# Patient Record
Sex: FEMALE | Race: WHITE | NOT HISPANIC OR LATINO | Employment: FULL TIME | URBAN - METROPOLITAN AREA
[De-identification: names, ages, dates, MRNs, and addresses within clinical notes are randomized per-mention and may not be internally consistent; named-entity substitution may affect disease eponyms.]

---

## 2017-07-21 ENCOUNTER — LAB REQUISITION (OUTPATIENT)
Dept: LAB | Facility: HOSPITAL | Age: 47
End: 2017-07-21
Payer: COMMERCIAL

## 2017-07-21 ENCOUNTER — ALLSCRIPTS OFFICE VISIT (OUTPATIENT)
Dept: OTHER | Facility: OTHER | Age: 47
End: 2017-07-21

## 2017-07-21 DIAGNOSIS — Z11.51 ENCOUNTER FOR SCREENING FOR HUMAN PAPILLOMAVIRUS (HPV): ICD-10-CM

## 2017-07-21 DIAGNOSIS — Z12.31 ENCOUNTER FOR SCREENING MAMMOGRAM FOR MALIGNANT NEOPLASM OF BREAST: ICD-10-CM

## 2017-07-21 PROCEDURE — 87624 HPV HI-RISK TYP POOLED RSLT: CPT | Performed by: OBSTETRICS & GYNECOLOGY

## 2017-07-21 PROCEDURE — G0145 SCR C/V CYTO,THINLAYER,RESCR: HCPCS | Performed by: OBSTETRICS & GYNECOLOGY

## 2017-07-25 LAB — HPV RRNA GENITAL QL NAA+PROBE: NORMAL

## 2017-07-26 LAB
LAB AP GYN PRIMARY INTERPRETATION: NORMAL
Lab: NORMAL

## 2017-08-18 ENCOUNTER — HOSPITAL ENCOUNTER (OUTPATIENT)
Dept: RADIOLOGY | Facility: MEDICAL CENTER | Age: 47
Discharge: HOME | End: 2017-08-18
Payer: COMMERCIAL

## 2017-08-18 DIAGNOSIS — Z12.31 ENCOUNTER FOR SCREENING MAMMOGRAM FOR MALIGNANT NEOPLASM OF BREAST: ICD-10-CM

## 2017-08-18 PROCEDURE — G0202 SCR MAMMO BI INCL CAD: HCPCS

## 2017-11-02 ENCOUNTER — OFFICE VISIT (OUTPATIENT)
Dept: URGENT CARE | Facility: MEDICAL CENTER | Age: 47
End: 2017-11-02
Payer: COMMERCIAL

## 2017-11-02 PROCEDURE — G0382 LEV 3 HOSP TYPE B ED VISIT: HCPCS

## 2017-11-02 PROCEDURE — S9083 URGENT CARE CENTER GLOBAL: HCPCS

## 2017-11-04 NOTE — PROGRESS NOTES
Assessment  1  Sinusitis (473 9) (J32 9)    Plan  Sinusitis    · Amoxicillin 500 MG Oral Capsule; TAKE 1 CAPSULE 3 TIMES DAILY UNTIL GONE    Discussion/Summary  Discussion Summary:   1  Push fluidsTake Amox 500mg  1 tablet 3x daily x 10 daysFollow-up with PCP if symptoms persist    Medication Side Effects Reviewed: Possible side effects of new medications were reviewed with the patient/guardian today  Understands and agrees with treatment plan: The treatment plan was reviewed with the patient/guardian  The patient/guardian understands and agrees with the treatment plan      Chief Complaint  Chief Complaint Free Text Note Form: pt presents today c/o cough, congestion for a week and a half  otc mucinex d      History of Present Illness  HPI: The patient is a 50-year-old female presents with a 10 day history of nasal discharge, cough and congestion  Her symptoms started initially with an upper respiratory infection but the cough and congestion have increased over the past several days  The patient denies any new fever but does complain of headache and sinus pressure  Hospital Based Practices Required Assessment:   Pain Assessment   the patient states they do not have pain  Abuse And Domestic Violence Screen   Domestic violence screen not done today  Reason DV Screen not done: with family    Depression And Suicide Screen  Suicide screen not done today  -- Reason suicide screen not done: with family  Prefered Language is  english  Primary Language is  english  Readiness To Learn: Receptive  Barriers To Learning: none  Preferred Learning: verbal      Review of Systems  Focused-Female:   Constitutional: No fever, no chills, feels well, no tiredness, no recent weight gain or loss  ENT: nasal discharge, but-- no earache,-- no sore throat-- and-- no hearing loss  Respiratory: cough-- and-- PND  Active Problems  1   Encounter for gynecological examination without abnormal finding (V82 31) (Z01 419) 2  Encounter for routine gynecological examination (V72 31) (Z01 419)   3  Encounter for screening mammogram for malignant neoplasm of breast (V76 12)   (Z12 31)   4  Fatigue (780 79) (R53 83)   5  Routine Gynecological Exam With Cervical Pap Smear (V72 31)   6  Screening for human papillomavirus (HPV) (V73 81) (Z11 51)   7  Skin irritation (709 9) (R23 8)    Past Medical History  1  History of Pulmonary disease (518 89) (J98 4)   2  History of Urinary tract infection (599 0) (N39 0)  Active Problems And Past Medical History Reviewed: The active problems and past medical history were reviewed and updated today  Family History  Child    1  Family history of Living and Healthy  Family History    2  Family history of Diabetes Mellitus (V18 0)  Family History Reviewed: The family history was reviewed and updated today  Social History   · Never A Smoker  Social History Reviewed: The social history was reviewed and updated today  Surgical History  1  History of Bladder Surgery  Surgical History Reviewed: The surgical history was reviewed and updated today  Current Meds   1  Multiple Vitamin TABS; Therapy: (Recorded:[id number]) to Recorded  Medication List Reviewed: The medication list was reviewed and updated today  Allergies  1  Erythromycin TABS   2  Penicillins    Vitals  Signs   Recorded: [id number] 04:04PM   Temperature: 97 9 F  Heart Rate: 68  Respiration: 20  Systolic: 700  Diastolic: 85  Height: 5 ft 4 in  Weight: 240 lb   BMI Calculated: 41 2  BSA Calculated: 2 11  O2 Saturation: 100    Physical Exam    Constitutional   General appearance: No acute distress, well appearing and well nourished  Ears, Nose, Mouth, and Throat   External inspection of ears and nose: Normal     Otoscopic examination: Tympanic membranes translucent with normal light reflex  Canals patent without erythema      Nasal mucosa, septum, and turbinates: Abnormal  -- Nasal turbinates are boggy and swollen, thin, clear drainage bilateral  Positive frontal and maxillary tenderness  Oropharynx: Normal with no erythema, edema, exudate or lesions  Pulmonary   Respiratory effort: No increased work of breathing or signs of respiratory distress  Auscultation of lungs: Clear to auscultation  Cardiovascular   Auscultation of heart: Normal rate and rhythm, normal S1 and S2, without murmurs         Future Appointments    Date/Time Provider Specialty Site   08/03/2018 10:00 AM Luz Marina Tierney DO Obstetrics/Gynecology [facility]     Signatures   Electronically signed by : Selin Barahona [facility]; Nov 2 2017  5:07PM EST                       (Author)    Electronically signed by : ELDON Cuellar ; Nov  3 2017  3:40PM EST                       (Co-author)

## 2018-01-13 VITALS
DIASTOLIC BLOOD PRESSURE: 88 MMHG | WEIGHT: 235.6 LBS | BODY MASS INDEX: 40.22 KG/M2 | SYSTOLIC BLOOD PRESSURE: 138 MMHG | HEIGHT: 64 IN

## 2018-08-03 ENCOUNTER — ANNUAL EXAM (OUTPATIENT)
Dept: OBGYN CLINIC | Facility: MEDICAL CENTER | Age: 48
End: 2018-08-03
Payer: COMMERCIAL

## 2018-08-03 VITALS
HEIGHT: 64 IN | SYSTOLIC BLOOD PRESSURE: 128 MMHG | DIASTOLIC BLOOD PRESSURE: 80 MMHG | BODY MASS INDEX: 40.43 KG/M2 | WEIGHT: 236.8 LBS

## 2018-08-03 DIAGNOSIS — Z12.31 SCREENING MAMMOGRAM, ENCOUNTER FOR: ICD-10-CM

## 2018-08-03 DIAGNOSIS — Z01.419 ENCOUNTER FOR GYNECOLOGICAL EXAMINATION WITHOUT ABNORMAL FINDING: Primary | ICD-10-CM

## 2018-08-03 PROCEDURE — S0612 ANNUAL GYNECOLOGICAL EXAMINA: HCPCS | Performed by: OBSTETRICS & GYNECOLOGY

## 2018-08-03 RX ORDER — ALBUTEROL SULFATE 90 UG/1
2 AEROSOL, METERED RESPIRATORY (INHALATION)
COMMUNITY
Start: 2014-10-07 | End: 2021-01-06 | Stop reason: SDUPTHER

## 2018-08-03 RX ORDER — CETIRIZINE HYDROCHLORIDE, PSEUDOEPHEDRINE HYDROCHLORIDE 5; 120 MG/1; MG/1
1 TABLET, FILM COATED, EXTENDED RELEASE ORAL 2 TIMES DAILY
COMMUNITY
End: 2020-10-28 | Stop reason: ALTCHOICE

## 2018-08-03 NOTE — PROGRESS NOTES
Assessment/Plan:    No problem-specific Assessment & Plan notes found for this encounter  Diagnoses and all orders for this visit:    Screening mammogram, encounter for  -     Mammo screening bilateral w 3d & cad; Future    Encounter for gynecological examination without abnormal finding    Other orders  -     albuterol (PROVENTIL HFA,VENTOLIN HFA) 90 mcg/act inhaler; Inhale 2 puffs  -     Multiple Vitamin-Folic Acid TABS; Take by mouth  -     cetirizine-pseudoephedrine (ZyrTEC-D) 5-120 MG per tablet; Take 1 tablet by mouth 2 (two) times a day        Annual examination was completed  Mammogram was ordered  Patient to return to the office in 1 year or as necessary  Subjective:      Patient ID: Katie Juan is a 53 y o  female  Patient returns for annual gyn visit  She has no new medical surgical issues  She has menses that are normally timed and minimal in flow  Gynecologic Exam         The following portions of the patient's history were reviewed and updated as appropriate:   She  has a past medical history of Pulmonary disease  She   Patient Active Problem List    Diagnosis Date Noted    Screening mammogram, encounter for 08/03/2018    Encounter for gynecological examination without abnormal finding 08/03/2018     She  has a past surgical history that includes Bladder surgery  Her family history includes Diabetes in her family  She  reports that she has never smoked  She has never used smokeless tobacco  She reports that she drinks alcohol  She reports that she does not use drugs  Current Outpatient Prescriptions   Medication Sig Dispense Refill    albuterol (PROVENTIL HFA,VENTOLIN HFA) 90 mcg/act inhaler Inhale 2 puffs      cetirizine-pseudoephedrine (ZyrTEC-D) 5-120 MG per tablet Take 1 tablet by mouth 2 (two) times a day      Multiple Vitamin-Folic Acid TABS Take by mouth       No current facility-administered medications for this visit        No current outpatient prescriptions on file prior to visit  No current facility-administered medications on file prior to visit  She is allergic to erythromycin and penicillins       Review of Systems   All other systems reviewed and are negative  Objective:      /80 (BP Location: Left arm, Patient Position: Sitting, Cuff Size: Large)   Ht 5' 4" (1 626 m)   Wt 107 kg (236 lb 12 8 oz)   LMP 07/14/2018   BMI 40 65 kg/m²          Physical Exam   Constitutional: She is oriented to person, place, and time  She appears well-developed and well-nourished  HENT:   Head: Normocephalic and atraumatic  Nose: Nose normal    Eyes: EOM are normal  Pupils are equal, round, and reactive to light  Neck: Normal range of motion  Neck supple  No thyromegaly present  Cardiovascular: Normal rate and regular rhythm  Pulmonary/Chest: Effort normal and breath sounds normal  No respiratory distress  Breasts no masses, tenderness, skin changes   Abdominal: Soft  Bowel sounds are normal  She exhibits no distension and no mass  There is no tenderness  Hernia confirmed negative in the right inguinal area and confirmed negative in the left inguinal area  Genitourinary: Vagina normal and uterus normal  No breast swelling, tenderness, discharge or bleeding  Pelvic exam was performed with patient supine  No labial fusion  There is no rash, tenderness, lesion or injury on the right labia  There is no rash, tenderness, lesion or injury on the left labia  Cervix exhibits no motion tenderness, no discharge and no friability  Genitourinary Comments: Ext genitalia nl female w/o lesions  Vag healthy without lesions or discharge  Cervix healthy w/o lesions or discharge  uterus nl size, NT, no mass  Adnexa NT, no mass   Musculoskeletal: Normal range of motion  She exhibits no edema  Lymphadenopathy:        Right: No inguinal adenopathy present  Left: No inguinal adenopathy present     Neurological: She is alert and oriented to person, place, and time  She has normal reflexes  Skin: Skin is warm and dry  No rash noted  Psychiatric: She has a normal mood and affect  Her behavior is normal  Thought content normal    Nursing note and vitals reviewed

## 2019-03-04 ENCOUNTER — HOSPITAL ENCOUNTER (OUTPATIENT)
Dept: RADIOLOGY | Facility: HOSPITAL | Age: 49
Discharge: HOME | End: 2019-03-04
Payer: COMMERCIAL

## 2019-03-04 ENCOUNTER — TRANSCRIBE ORDERS (OUTPATIENT)
Dept: ADMINISTRATIVE | Facility: HOSPITAL | Age: 49
End: 2019-03-04

## 2019-03-04 DIAGNOSIS — J45.30 MILD PERSISTENT ASTHMA WITHOUT COMPLICATION: Primary | ICD-10-CM

## 2019-03-04 DIAGNOSIS — J45.30 MILD PERSISTENT ASTHMA WITHOUT COMPLICATION: ICD-10-CM

## 2019-03-04 PROCEDURE — 71046 X-RAY EXAM CHEST 2 VIEWS: CPT

## 2019-03-04 PROCEDURE — 70220 X-RAY EXAM OF SINUSES: CPT

## 2019-08-09 ENCOUNTER — ANNUAL EXAM (OUTPATIENT)
Dept: OBGYN CLINIC | Facility: MEDICAL CENTER | Age: 49
End: 2019-08-09
Payer: COMMERCIAL

## 2019-08-09 VITALS
HEIGHT: 64 IN | DIASTOLIC BLOOD PRESSURE: 74 MMHG | BODY MASS INDEX: 43.67 KG/M2 | WEIGHT: 255.8 LBS | SYSTOLIC BLOOD PRESSURE: 132 MMHG

## 2019-08-09 DIAGNOSIS — Z01.419 ENCOUNTER FOR GYNECOLOGICAL EXAMINATION (GENERAL) (ROUTINE) WITHOUT ABNORMAL FINDINGS: Primary | ICD-10-CM

## 2019-08-09 DIAGNOSIS — R92.2 DENSE BREAST: ICD-10-CM

## 2019-08-09 DIAGNOSIS — Z12.39 SCREENING FOR MALIGNANT NEOPLASM OF BREAST: ICD-10-CM

## 2019-08-09 PROBLEM — R92.30 DENSE BREAST: Status: ACTIVE | Noted: 2019-08-09

## 2019-08-09 PROCEDURE — S0612 ANNUAL GYNECOLOGICAL EXAMINA: HCPCS | Performed by: OBSTETRICS & GYNECOLOGY

## 2019-08-09 RX ORDER — MELATONIN
1000 DAILY
COMMUNITY
End: 2021-11-01 | Stop reason: ALTCHOICE

## 2019-08-09 RX ORDER — LEVOCETIRIZINE DIHYDROCHLORIDE 5 MG/1
5 TABLET, FILM COATED ORAL EVERY EVENING
COMMUNITY
End: 2021-11-01 | Stop reason: ALTCHOICE

## 2019-08-09 RX ORDER — VITAMIN B COMPLEX
1 CAPSULE ORAL DAILY
COMMUNITY

## 2019-08-09 RX ORDER — TRIAMCINOLONE ACETONIDE 55 UG/1
SPRAY, METERED NASAL
COMMUNITY
End: 2021-11-01 | Stop reason: ALTCHOICE

## 2019-08-09 RX ORDER — COVID-19 ANTIGEN TEST
KIT MISCELLANEOUS
COMMUNITY
End: 2021-11-01 | Stop reason: ALTCHOICE

## 2019-08-09 NOTE — PROGRESS NOTES
Assessment/Plan:    No problem-specific Assessment & Plan notes found for this encounter  Diagnoses and all orders for this visit:    Screening for malignant neoplasm of breast  -     Mammo screening bilateral w 3d & cad; Future    Encounter for gynecological examination (general) (routine) without abnormal findings    Dense breast  -     Mammo screening bilateral w 3d & cad; Future    Other orders  -     levocetirizine (XYZAL) 5 MG tablet; Take 5 mg by mouth every evening  -     Triamcinolone Acetonide (NASACORT ALLERGY 24HR) 55 MCG/ACT AERO; into each nostril  -     b complex vitamins capsule; Take 1 capsule by mouth daily  -     cholecalciferol (VITAMIN D3) 1,000 units tablet; Take 1,000 Units by mouth daily  -     Naproxen Sodium (ALEVE) 220 MG CAPS; Take by mouth        Annual examination was completed  Mammogram was ordered  Patient to return to the office in 1 year or as necessary  Subjective:      Patient ID: Shine Stephens is a 51 y o  female  Patient returns for annual gyn visit  Since she was last seen she is recovering from right wrist and hand surgery  She will have her left wrist and hand operated on in November  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  She has no gynecologic complaints  Her menses have been normal in timing and flow  The following portions of the patient's history were reviewed and updated as appropriate:   She  has a past medical history of Pulmonary disease  She   Patient Active Problem List    Diagnosis Date Noted    Encounter for gynecological examination (general) (routine) without abnormal findings 08/09/2019    Dense breast 08/09/2019    Screening for malignant neoplasm of breast 08/09/2019    Screening mammogram, encounter for 08/03/2018    Encounter for gynecological examination without abnormal finding 08/03/2018     She  has a past surgical history that includes Bladder surgery    Her family history includes Diabetes in her family  She  reports that she has never smoked  She has never used smokeless tobacco  She reports that she drinks alcohol  She reports that she does not use drugs  Current Outpatient Medications   Medication Sig Dispense Refill    albuterol (PROVENTIL HFA,VENTOLIN HFA) 90 mcg/act inhaler Inhale 2 puffs      b complex vitamins capsule Take 1 capsule by mouth daily      cholecalciferol (VITAMIN D3) 1,000 units tablet Take 1,000 Units by mouth daily      levocetirizine (XYZAL) 5 MG tablet Take 5 mg by mouth every evening      Naproxen Sodium (ALEVE) 220 MG CAPS Take by mouth      Triamcinolone Acetonide (NASACORT ALLERGY 24HR) 55 MCG/ACT AERO into each nostril      cetirizine-pseudoephedrine (ZyrTEC-D) 5-120 MG per tablet Take 1 tablet by mouth 2 (two) times a day      Multiple Vitamin-Folic Acid TABS Take by mouth       No current facility-administered medications for this visit  Current Outpatient Medications on File Prior to Visit   Medication Sig    albuterol (PROVENTIL HFA,VENTOLIN HFA) 90 mcg/act inhaler Inhale 2 puffs    b complex vitamins capsule Take 1 capsule by mouth daily    cholecalciferol (VITAMIN D3) 1,000 units tablet Take 1,000 Units by mouth daily    levocetirizine (XYZAL) 5 MG tablet Take 5 mg by mouth every evening    Naproxen Sodium (ALEVE) 220 MG CAPS Take by mouth    Triamcinolone Acetonide (NASACORT ALLERGY 24HR) 55 MCG/ACT AERO into each nostril    cetirizine-pseudoephedrine (ZyrTEC-D) 5-120 MG per tablet Take 1 tablet by mouth 2 (two) times a day    Multiple Vitamin-Folic Acid TABS Take by mouth     No current facility-administered medications on file prior to visit  She is allergic to erythromycin and penicillins       Review of Systems   All other systems reviewed and are negative          Objective:      /74 (BP Location: Right arm, Patient Position: Sitting, Cuff Size: Large)   Ht 5' 4" (1 626 m)   Wt 116 kg (255 lb 12 8 oz)   LMP 08/06/2019   BMI 43 91 kg/m²          Physical Exam   Constitutional: She is oriented to person, place, and time  She appears well-developed and well-nourished  HENT:   Head: Normocephalic and atraumatic  Nose: Nose normal    Eyes: Pupils are equal, round, and reactive to light  EOM are normal    Neck: Normal range of motion  Neck supple  No thyromegaly present  Pulmonary/Chest: Effort normal  No respiratory distress  No breast tenderness, discharge or bleeding  Breasts no masses, tenderness, skin changes   Abdominal: Soft  She exhibits no distension and no mass  There is no tenderness  Hernia confirmed negative in the right inguinal area and confirmed negative in the left inguinal area  Genitourinary: Vagina normal and uterus normal  No breast tenderness, discharge or bleeding  Pelvic exam was performed with patient supine  No labial fusion  There is no rash, tenderness, lesion or injury on the right labia  There is no rash, tenderness, lesion or injury on the left labia  Cervix exhibits no motion tenderness, no discharge and no friability  Genitourinary Comments: Ext genitalia nl female w/o lesions  Vag healthy without lesions or discharge  Cervix healthy w/o lesions or discharge  uterus nl size, NT, no mass  Adnexa NT, no mass   Musculoskeletal: Normal range of motion  She exhibits no edema  Lymphadenopathy:        Right: No inguinal adenopathy present  Left: No inguinal adenopathy present  Neurological: She is alert and oriented to person, place, and time  She has normal reflexes  Skin: Skin is warm and dry  No rash noted  Psychiatric: She has a normal mood and affect  Her behavior is normal  Thought content normal    Nursing note and vitals reviewed

## 2019-09-19 ENCOUNTER — HOSPITAL ENCOUNTER (OUTPATIENT)
Dept: RADIOLOGY | Facility: MEDICAL CENTER | Age: 49
Discharge: HOME | End: 2019-09-19
Payer: COMMERCIAL

## 2019-09-19 VITALS — WEIGHT: 255 LBS | BODY MASS INDEX: 43.54 KG/M2 | HEIGHT: 64 IN

## 2019-09-19 DIAGNOSIS — Z12.39 SCREENING FOR MALIGNANT NEOPLASM OF BREAST: ICD-10-CM

## 2019-09-19 DIAGNOSIS — R92.2 DENSE BREAST: ICD-10-CM

## 2019-09-19 PROCEDURE — 77067 SCR MAMMO BI INCL CAD: CPT

## 2019-09-19 PROCEDURE — 77063 BREAST TOMOSYNTHESIS BI: CPT

## 2019-09-24 ENCOUNTER — HOSPITAL ENCOUNTER (OUTPATIENT)
Dept: ULTRASOUND IMAGING | Facility: CLINIC | Age: 49
Discharge: HOME | End: 2019-09-24
Payer: COMMERCIAL

## 2019-09-24 ENCOUNTER — HOSPITAL ENCOUNTER (OUTPATIENT)
Dept: MAMMOGRAPHY | Facility: CLINIC | Age: 49
Discharge: HOME | End: 2019-09-24
Payer: COMMERCIAL

## 2019-09-24 VITALS — HEIGHT: 64 IN | WEIGHT: 245 LBS | BODY MASS INDEX: 41.83 KG/M2

## 2019-09-24 DIAGNOSIS — R92.8 ABNORMAL MAMMOGRAM: ICD-10-CM

## 2019-09-24 PROCEDURE — G0279 TOMOSYNTHESIS, MAMMO: HCPCS

## 2019-09-24 PROCEDURE — 76642 ULTRASOUND BREAST LIMITED: CPT

## 2019-09-24 PROCEDURE — 77065 DX MAMMO INCL CAD UNI: CPT

## 2019-09-27 ENCOUNTER — TELEPHONE (OUTPATIENT)
Dept: OBGYN CLINIC | Facility: MEDICAL CENTER | Age: 49
End: 2019-09-27

## 2019-09-27 ENCOUNTER — TELEPHONE (OUTPATIENT)
Dept: OBGYN CLINIC | Facility: CLINIC | Age: 49
End: 2019-09-27

## 2019-09-27 DIAGNOSIS — N63.0 BREAST MASS IN FEMALE: Primary | ICD-10-CM

## 2019-09-27 NOTE — TELEPHONE ENCOUNTER
----- Message from Oly Brooks DO sent at 9/27/2019 12:09 PM EDT -----  Please order breast MRI, bilateral

## 2019-09-27 NOTE — TELEPHONE ENCOUNTER
Patient would like to know if Dr Nicolás Ortega received her report from the [facility] re her left breast

## 2019-09-27 NOTE — TELEPHONE ENCOUNTER
Spoke with pt - advised her that we do have the results, provider has not yet reviewed them Once he has, she will be notified

## 2019-09-30 NOTE — TELEPHONE ENCOUNTER
Pt returning call  Pt advised as directed  Pt stated she was advised at breast center and is aware she will call central scheduling and schedule

## 2019-10-03 ENCOUNTER — TELEPHONE (OUTPATIENT)
Dept: OBGYN CLINIC | Facility: CLINIC | Age: 49
End: 2019-10-03

## 2019-10-10 ENCOUNTER — HOSPITAL ENCOUNTER (OUTPATIENT)
Dept: RADIOLOGY | Facility: HOSPITAL | Age: 49
Discharge: HOME | End: 2019-10-10
Attending: OBSTETRICS & GYNECOLOGY
Payer: COMMERCIAL

## 2019-10-10 VITALS — BODY MASS INDEX: 42.17 KG/M2 | WEIGHT: 247 LBS | HEIGHT: 64 IN

## 2019-10-10 DIAGNOSIS — N63.0 BREAST MASS IN FEMALE: ICD-10-CM

## 2019-10-10 PROCEDURE — 77049 MRI BREAST C-+ W/CAD BI: CPT

## 2019-10-10 PROCEDURE — C8908 MRI W/O FOL W/CONT, BREAST,: HCPCS

## 2019-10-10 PROCEDURE — A9585 GADOBUTROL INJECTION: HCPCS | Performed by: OBSTETRICS & GYNECOLOGY

## 2019-10-10 RX ADMIN — GADOBUTROL 11 ML: 604.72 INJECTION INTRAVENOUS at 18:09

## 2019-10-14 ENCOUNTER — TELEPHONE (OUTPATIENT)
Dept: OBGYN CLINIC | Facility: CLINIC | Age: 49
End: 2019-10-14

## 2019-10-14 NOTE — TELEPHONE ENCOUNTER
----- Message from Alee Hansen DO sent at 10/14/2019  8:53 AM EDT -----  Inform patient that breast MRI is normal

## 2020-10-28 ENCOUNTER — ANNUAL EXAM (OUTPATIENT)
Dept: OBGYN CLINIC | Facility: CLINIC | Age: 50
End: 2020-10-28
Payer: COMMERCIAL

## 2020-10-28 VITALS
DIASTOLIC BLOOD PRESSURE: 90 MMHG | SYSTOLIC BLOOD PRESSURE: 158 MMHG | BODY MASS INDEX: 42.57 KG/M2 | TEMPERATURE: 96.3 F | WEIGHT: 248 LBS

## 2020-10-28 DIAGNOSIS — Z01.419 ENCNTR FOR GYN EXAM (GENERAL) (ROUTINE) W/O ABN FINDINGS: Primary | ICD-10-CM

## 2020-10-28 DIAGNOSIS — Z12.31 ENCOUNTER FOR SCREENING MAMMOGRAM FOR MALIGNANT NEOPLASM OF BREAST: ICD-10-CM

## 2020-10-28 DIAGNOSIS — Z12.11 COLON CANCER SCREENING: ICD-10-CM

## 2020-10-28 DIAGNOSIS — N92.6 IRREGULAR BLEEDING: ICD-10-CM

## 2020-10-28 PROBLEM — Z12.39 SCREENING FOR MALIGNANT NEOPLASM OF BREAST: Status: RESOLVED | Noted: 2019-08-09 | Resolved: 2020-10-28

## 2020-10-28 PROCEDURE — S0612 ANNUAL GYNECOLOGICAL EXAMINA: HCPCS | Performed by: PHYSICIAN ASSISTANT

## 2020-12-03 ENCOUNTER — TELEPHONE (OUTPATIENT)
Dept: GASTROENTEROLOGY | Facility: CLINIC | Age: 50
End: 2020-12-03

## 2021-01-06 ENCOUNTER — OFFICE VISIT (OUTPATIENT)
Dept: FAMILY MEDICINE CLINIC | Facility: CLINIC | Age: 51
End: 2021-01-06
Payer: COMMERCIAL

## 2021-01-06 ENCOUNTER — APPOINTMENT (OUTPATIENT)
Dept: RADIOLOGY | Facility: MEDICAL CENTER | Age: 51
End: 2021-01-06
Payer: COMMERCIAL

## 2021-01-06 VITALS
BODY MASS INDEX: 42 KG/M2 | WEIGHT: 246 LBS | HEIGHT: 64 IN | HEART RATE: 71 BPM | SYSTOLIC BLOOD PRESSURE: 118 MMHG | RESPIRATION RATE: 16 BRPM | OXYGEN SATURATION: 98 % | DIASTOLIC BLOOD PRESSURE: 84 MMHG | TEMPERATURE: 96 F

## 2021-01-06 DIAGNOSIS — M54.50 ACUTE RIGHT-SIDED LOW BACK PAIN, UNSPECIFIED WHETHER SCIATICA PRESENT: ICD-10-CM

## 2021-01-06 DIAGNOSIS — M54.50 ACUTE RIGHT-SIDED LOW BACK PAIN, UNSPECIFIED WHETHER SCIATICA PRESENT: Primary | ICD-10-CM

## 2021-01-06 DIAGNOSIS — J98.01 BRONCHOSPASM: ICD-10-CM

## 2021-01-06 DIAGNOSIS — M25.559 HIP PAIN: ICD-10-CM

## 2021-01-06 PROCEDURE — 99203 OFFICE O/P NEW LOW 30 MIN: CPT | Performed by: INTERNAL MEDICINE

## 2021-01-06 PROCEDURE — 3725F SCREEN DEPRESSION PERFORMED: CPT | Performed by: INTERNAL MEDICINE

## 2021-01-06 PROCEDURE — 73502 X-RAY EXAM HIP UNI 2-3 VIEWS: CPT

## 2021-01-06 PROCEDURE — 72100 X-RAY EXAM L-S SPINE 2/3 VWS: CPT

## 2021-01-06 PROCEDURE — 3008F BODY MASS INDEX DOCD: CPT | Performed by: INTERNAL MEDICINE

## 2021-01-06 PROCEDURE — 1036F TOBACCO NON-USER: CPT | Performed by: INTERNAL MEDICINE

## 2021-01-06 RX ORDER — ALBUTEROL SULFATE 90 UG/1
2 AEROSOL, METERED RESPIRATORY (INHALATION) EVERY 4 HOURS PRN
Qty: 1 INHALER | Refills: 3 | Status: SHIPPED | OUTPATIENT
Start: 2021-01-06

## 2021-01-06 RX ORDER — NABUMETONE 750 MG/1
750 TABLET, FILM COATED ORAL 2 TIMES DAILY
Qty: 30 TABLET | Refills: 1 | Status: SHIPPED | OUTPATIENT
Start: 2021-01-06 | End: 2021-11-01 | Stop reason: ALTCHOICE

## 2021-01-06 RX ORDER — METHYLPREDNISOLONE 4 MG/1
TABLET ORAL
Qty: 21 EACH | Refills: 0 | Status: SHIPPED | OUTPATIENT
Start: 2021-01-06 | End: 2021-05-22

## 2021-01-06 RX ORDER — CYCLOBENZAPRINE HCL 10 MG
10 TABLET ORAL
Qty: 15 TABLET | Refills: 1 | Status: SHIPPED | OUTPATIENT
Start: 2021-01-06 | End: 2021-05-22

## 2021-01-06 NOTE — PROGRESS NOTES
BMI Counseling: Body mass index is 42 56 kg/m²  The BMI is above normal  Nutrition recommendations include decreasing portion sizes and limiting drinks that contain sugar  Exercise recommendations include exercising 3-5 times per week  No pharmacotherapy was ordered  Patient referred to PCP due to patient being overweight  Assessment/Plan:         Diagnoses and all orders for this visit:    Acute right-sided low back pain, unspecified whether sciatica present  Comments:  nsaid/medrol/ gi protection/ lidoderm patch  Orders:  -     XR spine lumbar 2 or 3 views injury; Future  -     nabumetone (RELAFEN) 750 mg tablet; Take 1 tablet (750 mg total) by mouth 2 (two) times a day  -     methylPREDNISolone 4 MG tablet therapy pack; Use as directed on package  -     cyclobenzaprine (FLEXERIL) 10 mg tablet; Take 1 tablet (10 mg total) by mouth daily at bedtime as needed for muscle spasms    Hip pain  -     nabumetone (RELAFEN) 750 mg tablet; Take 1 tablet (750 mg total) by mouth 2 (two) times a day  -     methylPREDNISolone 4 MG tablet therapy pack; Use as directed on package  -     cyclobenzaprine (FLEXERIL) 10 mg tablet; Take 1 tablet (10 mg total) by mouth daily at bedtime as needed for muscle spasms  -     XR hip/pelv 2-3 vws right if performed; Future    Bronchospasm  -     albuterol (PROVENTIL HFA,VENTOLIN HFA) 90 mcg/act inhaler; Inhale 2 puffs every 4 (four) hours as needed for wheezing    Other orders  -     Pseudoephedrine-guaiFENesin (MUCINEX D PO); Take by mouth          Subjective:      Patient ID: Shanice Thomas is a 53 y o  female  Pt states 12/22 or 23rd went out in snow to get old dog  She slid down the hill and tensed up  Did not fall she pulled her rt low back  And rt hip  She is using aleve 2 bid  She is using biofreeze and it helps  +numb lateral rt foot  Hurts to sit down  She relates did not go to er          The following portions of the patient's history were reviewed and updated as appropriate: She  has a past medical history of Pulmonary disease  She   Patient Active Problem List    Diagnosis Date Noted    Dense breast 08/09/2019     She  has a past surgical history that includes Bladder surgery and Hand reconstruction (Bilateral, 5/2019-11/2019)  Her family history includes Breast cancer (age of onset: [de-identified]) in her paternal grandmother; Diabetes in her family; No Known Problems in her daughter, father, maternal grandfather, maternal grandmother, mother, paternal grandfather, and son  She  reports that she has never smoked  She has never used smokeless tobacco  She reports current alcohol use  She reports that she does not use drugs  Current Outpatient Medications   Medication Sig Dispense Refill    albuterol (PROVENTIL HFA,VENTOLIN HFA) 90 mcg/act inhaler Inhale 2 puffs every 4 (four) hours as needed for wheezing 1 Inhaler 3    b complex vitamins capsule Take 1 capsule by mouth daily      cholecalciferol (VITAMIN D3) 1,000 units tablet Take 1,000 Units by mouth daily      levocetirizine (XYZAL) 5 MG tablet Take 5 mg by mouth every evening      Multiple Vitamin-Folic Acid TABS Take by mouth      Naproxen Sodium (ALEVE) 220 MG CAPS Take by mouth      Pseudoephedrine-guaiFENesin (MUCINEX D PO) Take by mouth      Triamcinolone Acetonide (NASACORT ALLERGY 24HR) 55 MCG/ACT AERO into each nostril      cyclobenzaprine (FLEXERIL) 10 mg tablet Take 1 tablet (10 mg total) by mouth daily at bedtime as needed for muscle spasms 15 tablet 1    methylPREDNISolone 4 MG tablet therapy pack Use as directed on package 21 each 0    nabumetone (RELAFEN) 750 mg tablet Take 1 tablet (750 mg total) by mouth 2 (two) times a day 30 tablet 1     No current facility-administered medications for this visit        Current Outpatient Medications on File Prior to Visit   Medication Sig    b complex vitamins capsule Take 1 capsule by mouth daily    cholecalciferol (VITAMIN D3) 1,000 units tablet Take 1,000 Units by mouth daily    levocetirizine (XYZAL) 5 MG tablet Take 5 mg by mouth every evening    Multiple Vitamin-Folic Acid TABS Take by mouth    Naproxen Sodium (ALEVE) 220 MG CAPS Take by mouth    Pseudoephedrine-guaiFENesin (MUCINEX D PO) Take by mouth    Triamcinolone Acetonide (NASACORT ALLERGY 24HR) 55 MCG/ACT AERO into each nostril     No current facility-administered medications on file prior to visit  She is allergic to erythromycin and penicillins       Review of Systems   Constitutional: Negative  Negative for chills and fever  HENT: Negative  Respiratory: Negative  Cardiovascular: Negative  Musculoskeletal: Positive for back pain  Objective:      /84 (BP Location: Left arm, Patient Position: Sitting, Cuff Size: Large)   Pulse 71   Temp (!) 96 °F (35 6 °C) (Temporal)   Resp 16   Ht 5' 3 75" (1 619 m)   Wt 112 kg (246 lb)   SpO2 98%   BMI 42 56 kg/m²          Physical Exam  Constitutional:       Appearance: She is obese  HENT:      Head: Normocephalic and atraumatic  Right Ear: Tympanic membrane and ear canal normal       Left Ear: Tympanic membrane and ear canal normal    Neck:      Musculoskeletal: Neck supple  Cardiovascular:      Rate and Rhythm: Normal rate and regular rhythm  Pulmonary:      Effort: Pulmonary effort is normal       Breath sounds: Normal breath sounds  Musculoskeletal:      Comments: Le str 5/5   Neurological:      Mental Status: She is alert

## 2021-01-23 ENCOUNTER — IMMUNIZATIONS (OUTPATIENT)
Dept: FAMILY MEDICINE CLINIC | Facility: HOSPITAL | Age: 51
End: 2021-01-23

## 2021-01-23 DIAGNOSIS — Z23 ENCOUNTER FOR IMMUNIZATION: Primary | ICD-10-CM

## 2021-01-23 PROCEDURE — 91301 SARS-COV-2 / COVID-19 MRNA VACCINE (MODERNA) 100 MCG: CPT

## 2021-01-23 PROCEDURE — 0011A SARS-COV-2 / COVID-19 MRNA VACCINE (MODERNA) 100 MCG: CPT

## 2021-02-19 ENCOUNTER — IMMUNIZATIONS (OUTPATIENT)
Dept: FAMILY MEDICINE CLINIC | Facility: HOSPITAL | Age: 51
End: 2021-02-19

## 2021-02-19 DIAGNOSIS — Z23 ENCOUNTER FOR IMMUNIZATION: Primary | ICD-10-CM

## 2021-02-19 PROCEDURE — 0012A SARS-COV-2 / COVID-19 MRNA VACCINE (MODERNA) 100 MCG: CPT

## 2021-02-19 PROCEDURE — 91301 SARS-COV-2 / COVID-19 MRNA VACCINE (MODERNA) 100 MCG: CPT

## 2021-03-30 ENCOUNTER — TELEPHONE (OUTPATIENT)
Dept: OBGYN CLINIC | Facility: CLINIC | Age: 51
End: 2021-03-30

## 2021-03-30 ENCOUNTER — HOSPITAL ENCOUNTER (OUTPATIENT)
Dept: RADIOLOGY | Age: 51
Discharge: HOME | End: 2021-03-30
Payer: COMMERCIAL

## 2021-03-30 VITALS — HEIGHT: 64 IN | WEIGHT: 245 LBS | BODY MASS INDEX: 41.83 KG/M2

## 2021-03-30 DIAGNOSIS — Z12.31 ENCOUNTER FOR SCREENING MAMMOGRAM FOR MALIGNANT NEOPLASM OF BREAST: ICD-10-CM

## 2021-03-30 DIAGNOSIS — N63.20 BREAST MASS, LEFT: Primary | ICD-10-CM

## 2021-03-30 PROCEDURE — 77067 SCR MAMMO BI INCL CAD: CPT

## 2021-03-30 PROCEDURE — 77063 BREAST TOMOSYNTHESIS BI: CPT

## 2021-03-30 NOTE — TELEPHONE ENCOUNTER
Per comm consent lm to pt that I will be placing and US for diagnostic left asap    Pt called back and I reviewed the report with her  The mass at [facility] was per radiology, a new finding  She will call to set it up   Last covid shot around 2/9/2021      ----- Message from Magali Willis sent at 3/30/2021  1:55 PM EDT -----  This is a [facility] patient   Screening mammo with additional views of the left breast indicated   Please confirm that the patient is aware and provide order for diagnostic left US

## 2021-04-01 ENCOUNTER — HOSPITAL ENCOUNTER (OUTPATIENT)
Dept: ULTRASOUND IMAGING | Facility: CLINIC | Age: 51
Discharge: HOME | End: 2021-04-01
Payer: COMMERCIAL

## 2021-04-01 VITALS — WEIGHT: 245 LBS | HEIGHT: 64 IN | BODY MASS INDEX: 41.83 KG/M2

## 2021-04-01 DIAGNOSIS — N63.20 BREAST MASS, LEFT: ICD-10-CM

## 2021-04-01 PROCEDURE — 76642 ULTRASOUND BREAST LIMITED: CPT

## 2021-04-07 ENCOUNTER — TELEPHONE (OUTPATIENT)
Dept: OBGYN CLINIC | Facility: CLINIC | Age: 51
End: 2021-04-07

## 2021-04-07 NOTE — TELEPHONE ENCOUNTER
Per comm consent lm to pt re: results of imaging    ----- Message from [place], [place] sent at 4/6/2021  5:56 PM EDT -----  Benign findings on dx breast imaging   There is a benign cyst in the area of concern   Please confirm that the patient is aware of results and advise that she may resume annual screening mammo per Radiology

## 2021-05-18 ENCOUNTER — OFFICE VISIT (OUTPATIENT)
Dept: FAMILY MEDICINE CLINIC | Facility: CLINIC | Age: 51
End: 2021-05-18
Payer: COMMERCIAL

## 2021-05-18 VITALS
WEIGHT: 250.6 LBS | TEMPERATURE: 98.2 F | HEIGHT: 64 IN | BODY MASS INDEX: 42.78 KG/M2 | OXYGEN SATURATION: 98 % | HEART RATE: 72 BPM | DIASTOLIC BLOOD PRESSURE: 92 MMHG | SYSTOLIC BLOOD PRESSURE: 144 MMHG

## 2021-05-18 DIAGNOSIS — M54.50 ACUTE RIGHT-SIDED LOW BACK PAIN, UNSPECIFIED WHETHER SCIATICA PRESENT: Primary | ICD-10-CM

## 2021-05-18 PROBLEM — J45.30 MILD PERSISTENT ASTHMA WITHOUT COMPLICATION: Status: ACTIVE | Noted: 2021-05-18

## 2021-05-18 PROCEDURE — 3008F BODY MASS INDEX DOCD: CPT | Performed by: INTERNAL MEDICINE

## 2021-05-18 PROCEDURE — 1036F TOBACCO NON-USER: CPT | Performed by: INTERNAL MEDICINE

## 2021-05-18 PROCEDURE — 99213 OFFICE O/P EST LOW 20 MIN: CPT | Performed by: INTERNAL MEDICINE

## 2021-05-18 RX ORDER — METHYLPREDNISOLONE 4 MG/1
TABLET ORAL
Qty: 21 EACH | Refills: 0 | Status: SHIPPED | OUTPATIENT
Start: 2021-05-18 | End: 2021-11-01 | Stop reason: ALTCHOICE

## 2021-05-18 RX ORDER — NABUMETONE 750 MG/1
750 TABLET, FILM COATED ORAL 2 TIMES DAILY
Qty: 60 TABLET | Refills: 1 | Status: SHIPPED | OUTPATIENT
Start: 2021-05-18 | End: 2021-11-01 | Stop reason: ALTCHOICE

## 2021-05-18 RX ORDER — CYCLOBENZAPRINE HCL 10 MG
10 TABLET ORAL 3 TIMES DAILY PRN
Qty: 15 TABLET | Refills: 0 | Status: SHIPPED | OUTPATIENT
Start: 2021-05-18 | End: 2021-11-01 | Stop reason: ALTCHOICE

## 2021-05-18 NOTE — PROGRESS NOTES
Assessment/Plan:         Diagnoses and all orders for this visit:    Acute right-sided low back pain, unspecified whether sciatica present  -     nabumetone (RELAFEN) 750 mg tablet; Take 1 tablet (750 mg total) by mouth 2 (two) times a day  -     methylPREDNISolone 4 MG tablet therapy pack; Use as directed on package  -     cyclobenzaprine (FLEXERIL) 10 mg tablet; Take 1 tablet (10 mg total) by mouth 3 (three) times a day as needed for muscle spasms          Subjective:      Patient ID: Fouzia Back is a 49 y o  female  Pt states she went to er and sat on hard chair and now rt lower and hip now hurt again  She said it goes down to her foot  Numb in her toes and arch of foot and does not feel it in her leg  It hurts x 1 week and no matter how she lays it hurts  Using biofreeze  Using 2 aleve bid x gi protection  Back Pain  This is a recurrent problem  The current episode started in the past 7 days  The problem occurs constantly  The problem has been waxing and waning since onset  The pain is present in the sacro-iliac  The quality of the pain is described as cramping  The pain radiates to the right foot  The pain is at a severity of 7/10  The pain is worse during the night  The symptoms are aggravated by bending, lying down and sitting  Stiffness is present all day  Associated symptoms include leg pain, numbness, tingling and weakness  Pertinent negatives include no abdominal pain, bladder incontinence, bowel incontinence, chest pain, dysuria, fever, headaches, paresis, paresthesias, pelvic pain, perianal numbness or weight loss  Risk factors include menopause and obesity  The following portions of the patient's history were reviewed and updated as appropriate: She  has a past medical history of Pulmonary disease    She   Patient Active Problem List    Diagnosis Date Noted    Mild persistent asthma without complication 49/02/0531    Dense breast 08/09/2019     She  has a past surgical history that includes Bladder surgery and Hand reconstruction (Bilateral, 5/2019-11/2019)  Her family history includes Breast cancer (age of onset: [de-identified]) in her paternal grandmother; Diabetes in her family; No Known Problems in her daughter, father, maternal grandfather, maternal grandmother, mother, paternal aunt, paternal grandfather, and son  She  reports that she has never smoked  She has never used smokeless tobacco  She reports current alcohol use  She reports that she does not use drugs  Current Outpatient Medications   Medication Sig Dispense Refill    albuterol (PROVENTIL HFA,VENTOLIN HFA) 90 mcg/act inhaler Inhale 2 puffs every 4 (four) hours as needed for wheezing 1 Inhaler 3    b complex vitamins capsule Take 1 capsule by mouth daily      levocetirizine (XYZAL) 5 MG tablet Take 5 mg by mouth every evening      Multiple Vitamin-Folic Acid TABS Take by mouth      Naproxen Sodium (ALEVE) 220 MG CAPS Take by mouth      Pseudoephedrine-guaiFENesin (MUCINEX D PO) Take by mouth      cholecalciferol (VITAMIN D3) 1,000 units tablet Take 1,000 Units by mouth daily      cyclobenzaprine (FLEXERIL) 10 mg tablet Take 1 tablet (10 mg total) by mouth daily at bedtime as needed for muscle spasms 15 tablet 1    cyclobenzaprine (FLEXERIL) 10 mg tablet Take 1 tablet (10 mg total) by mouth 3 (three) times a day as needed for muscle spasms 15 tablet 0    methylPREDNISolone 4 MG tablet therapy pack Use as directed on package 21 each 0    methylPREDNISolone 4 MG tablet therapy pack Use as directed on package 21 each 0    nabumetone (RELAFEN) 750 mg tablet Take 1 tablet (750 mg total) by mouth 2 (two) times a day 30 tablet 1    nabumetone (RELAFEN) 750 mg tablet Take 1 tablet (750 mg total) by mouth 2 (two) times a day 60 tablet 1    Triamcinolone Acetonide (NASACORT ALLERGY 24HR) 55 MCG/ACT AERO into each nostril       No current facility-administered medications for this visit        Current Outpatient Medications on File Prior to Visit   Medication Sig    albuterol (PROVENTIL HFA,VENTOLIN HFA) 90 mcg/act inhaler Inhale 2 puffs every 4 (four) hours as needed for wheezing    b complex vitamins capsule Take 1 capsule by mouth daily    levocetirizine (XYZAL) 5 MG tablet Take 5 mg by mouth every evening    Multiple Vitamin-Folic Acid TABS Take by mouth    Naproxen Sodium (ALEVE) 220 MG CAPS Take by mouth    Pseudoephedrine-guaiFENesin (MUCINEX D PO) Take by mouth    cholecalciferol (VITAMIN D3) 1,000 units tablet Take 1,000 Units by mouth daily    cyclobenzaprine (FLEXERIL) 10 mg tablet Take 1 tablet (10 mg total) by mouth daily at bedtime as needed for muscle spasms    methylPREDNISolone 4 MG tablet therapy pack Use as directed on package    nabumetone (RELAFEN) 750 mg tablet Take 1 tablet (750 mg total) by mouth 2 (two) times a day    Triamcinolone Acetonide (NASACORT ALLERGY 24HR) 55 MCG/ACT AERO into each nostril     No current facility-administered medications on file prior to visit  She is allergic to erythromycin and penicillins       Review of Systems   Constitutional: Negative  Negative for fever and weight loss  HENT: Negative  Respiratory: Negative  Cardiovascular: Negative  Negative for chest pain  Gastrointestinal: Negative for abdominal pain and bowel incontinence  Genitourinary: Negative for bladder incontinence, dysuria and pelvic pain  Musculoskeletal: Positive for arthralgias and back pain  Neurological: Positive for tingling, weakness and numbness  Negative for headaches and paresthesias  Objective:      /92 (BP Location: Left arm, Patient Position: Sitting, Cuff Size: Standard)   Pulse 72   Temp 98 2 °F (36 8 °C)   Ht 5' 4" (1 626 m)   Wt 114 kg (250 lb 9 6 oz)   LMP 05/08/2021   SpO2 98%   BMI 43 02 kg/m²          Physical Exam  Constitutional:       Appearance: She is obese  HENT:      Head: Normocephalic and atraumatic        Right Ear: Tympanic membrane normal       Left Ear: Tympanic membrane normal       Nose: Nose normal    Neck:      Musculoskeletal: Neck supple  Cardiovascular:      Rate and Rhythm: Normal rate and regular rhythm  Musculoskeletal:      Comments: str =b/l   Lymphadenopathy:      Cervical: No cervical adenopathy  Neurological:      Mental Status: She is alert

## 2021-11-01 ENCOUNTER — ANNUAL EXAM (OUTPATIENT)
Dept: OBGYN CLINIC | Facility: MEDICAL CENTER | Age: 51
End: 2021-11-01
Payer: COMMERCIAL

## 2021-11-01 VITALS
BODY MASS INDEX: 41.28 KG/M2 | HEIGHT: 64 IN | SYSTOLIC BLOOD PRESSURE: 136 MMHG | WEIGHT: 241.8 LBS | DIASTOLIC BLOOD PRESSURE: 80 MMHG

## 2021-11-01 DIAGNOSIS — Z01.419 ENCNTR FOR GYN EXAM (GENERAL) (ROUTINE) W/O ABN FINDINGS: ICD-10-CM

## 2021-11-01 DIAGNOSIS — Z12.31 ENCOUNTER FOR SCREENING MAMMOGRAM FOR MALIGNANT NEOPLASM OF BREAST: ICD-10-CM

## 2021-11-01 PROCEDURE — G0145 SCR C/V CYTO,THINLAYER,RESCR: HCPCS | Performed by: PHYSICIAN ASSISTANT

## 2021-11-01 PROCEDURE — S0612 ANNUAL GYNECOLOGICAL EXAMINA: HCPCS | Performed by: PHYSICIAN ASSISTANT

## 2021-11-01 PROCEDURE — G0476 HPV COMBO ASSAY CA SCREEN: HCPCS | Performed by: PHYSICIAN ASSISTANT

## 2021-11-01 RX ORDER — CETIRIZINE HYDROCHLORIDE, PSEUDOEPHEDRINE HYDROCHLORIDE 5; 120 MG/1; MG/1
1 TABLET, FILM COATED, EXTENDED RELEASE ORAL 2 TIMES DAILY
COMMUNITY

## 2021-11-02 LAB
HPV HR 12 DNA CVX QL NAA+PROBE: NEGATIVE
HPV16 DNA CVX QL NAA+PROBE: NEGATIVE
HPV18 DNA CVX QL NAA+PROBE: NEGATIVE

## 2021-11-05 LAB
LAB AP GYN PRIMARY INTERPRETATION: NORMAL
Lab: NORMAL

## 2021-11-30 ENCOUNTER — AMB VIDEO VISIT (OUTPATIENT)
Dept: OTHER | Facility: HOSPITAL | Age: 51
End: 2021-11-30
Payer: COMMERCIAL

## 2021-11-30 DIAGNOSIS — R59.0 LYMPHADENOPATHY, AXILLARY: Primary | ICD-10-CM

## 2021-11-30 PROCEDURE — 99212 OFFICE O/P EST SF 10 MIN: CPT | Performed by: PHYSICIAN ASSISTANT

## 2021-11-30 RX ORDER — PREDNISONE 20 MG/1
40 TABLET ORAL DAILY
Qty: 6 TABLET | Refills: 0 | Status: SHIPPED | OUTPATIENT
Start: 2021-11-30 | End: 2021-12-03

## 2021-12-01 ENCOUNTER — AMB VIDEO VISIT (OUTPATIENT)
Dept: OTHER | Facility: HOSPITAL | Age: 51
End: 2021-12-01

## 2021-12-01 ENCOUNTER — TELEPHONE (OUTPATIENT)
Dept: OBGYN CLINIC | Facility: CLINIC | Age: 51
End: 2021-12-01

## 2021-12-01 PROBLEM — Z91.89 INCREASED RISK OF BREAST CANCER: Status: ACTIVE | Noted: 2021-12-01

## 2021-12-01 PROBLEM — R92.30 DENSE BREAST TISSUE ON MAMMOGRAM: Status: ACTIVE | Noted: 2021-12-01

## 2021-12-01 PROBLEM — R92.2 DENSE BREAST TISSUE ON MAMMOGRAM: Status: ACTIVE | Noted: 2021-12-01

## 2021-12-02 ENCOUNTER — OFFICE VISIT (OUTPATIENT)
Dept: OBGYN CLINIC | Facility: CLINIC | Age: 51
End: 2021-12-02
Payer: COMMERCIAL

## 2021-12-02 VITALS
BODY MASS INDEX: 41.59 KG/M2 | SYSTOLIC BLOOD PRESSURE: 138 MMHG | DIASTOLIC BLOOD PRESSURE: 80 MMHG | HEIGHT: 64 IN | WEIGHT: 243.6 LBS

## 2021-12-02 DIAGNOSIS — T50.B95A ADVERSE EFFECT OF MRNA COVID-19 VACCINE: ICD-10-CM

## 2021-12-02 DIAGNOSIS — M79.622 AXILLARY PAIN, LEFT: Primary | ICD-10-CM

## 2021-12-02 DIAGNOSIS — R92.2 DENSE BREAST TISSUE ON MAMMOGRAM: ICD-10-CM

## 2021-12-02 DIAGNOSIS — Z91.89 INCREASED RISK OF BREAST CANCER: ICD-10-CM

## 2021-12-02 PROCEDURE — 99212 OFFICE O/P EST SF 10 MIN: CPT | Performed by: OBSTETRICS & GYNECOLOGY

## 2021-12-02 PROCEDURE — 3008F BODY MASS INDEX DOCD: CPT | Performed by: OBSTETRICS & GYNECOLOGY

## 2022-03-31 ENCOUNTER — HOSPITAL ENCOUNTER (OUTPATIENT)
Dept: RADIOLOGY | Age: 52
Discharge: HOME | End: 2022-03-31
Payer: COMMERCIAL

## 2022-03-31 VITALS — HEIGHT: 64 IN | WEIGHT: 248 LBS | BODY MASS INDEX: 42.34 KG/M2

## 2022-03-31 DIAGNOSIS — Z12.31 ENCOUNTER FOR SCREENING MAMMOGRAM FOR MALIGNANT NEOPLASM OF BREAST: ICD-10-CM

## 2022-03-31 PROCEDURE — 77063 BREAST TOMOSYNTHESIS BI: CPT

## 2022-03-31 PROCEDURE — 77067 SCR MAMMO BI INCL CAD: CPT

## 2022-09-14 ENCOUNTER — OFFICE VISIT (OUTPATIENT)
Dept: URGENT CARE | Facility: MEDICAL CENTER | Age: 52
End: 2022-09-14
Payer: COMMERCIAL

## 2022-09-14 VITALS
HEIGHT: 64 IN | OXYGEN SATURATION: 96 % | TEMPERATURE: 97.7 F | BODY MASS INDEX: 42.34 KG/M2 | RESPIRATION RATE: 18 BRPM | HEART RATE: 110 BPM | WEIGHT: 248 LBS

## 2022-09-14 DIAGNOSIS — R05.9 COUGH: Primary | ICD-10-CM

## 2022-09-14 PROCEDURE — S9083 URGENT CARE CENTER GLOBAL: HCPCS | Performed by: PHYSICIAN ASSISTANT

## 2022-09-14 PROCEDURE — 87636 SARSCOV2 & INF A&B AMP PRB: CPT | Performed by: PHYSICIAN ASSISTANT

## 2022-09-14 PROCEDURE — G0382 LEV 3 HOSP TYPE B ED VISIT: HCPCS | Performed by: PHYSICIAN ASSISTANT

## 2022-09-14 RX ORDER — BENZONATATE 100 MG/1
100 CAPSULE ORAL 3 TIMES DAILY PRN
Qty: 20 CAPSULE | Refills: 0 | Status: SHIPPED | OUTPATIENT
Start: 2022-09-14

## 2022-09-14 RX ORDER — ALBUTEROL SULFATE 90 UG/1
2 AEROSOL, METERED RESPIRATORY (INHALATION) EVERY 6 HOURS PRN
Qty: 8.5 G | Refills: 0 | Status: SHIPPED | OUTPATIENT
Start: 2022-09-14

## 2022-09-14 NOTE — LETTER
September 14, 2022     Patient: Diana Quinonez   YOB: 1970   Date of Visit: 9/14/2022       To Whom it May Concern:    Diana Quinonez was seen in my clinic on 9/14/2022  If Covid and flu tests are negative, patient may return to work when fever free for 24 hours without the use of a fever reducing agent  If covid or flu test is positive, patient may return to work 9/19/22 and when fever free for 24 hours without the use of a fever reducing agent  Upon return, the patient must then adhere to strict masking for an additional 5 days  If you have any questions or concerns, please don't hesitate to call           Sincerely,          [facility]        CC: No Recipients

## 2022-09-14 NOTE — PROGRESS NOTES
[facility]        NAME: Adilson Jennings is a 47 y o  female  : 1970    MRN: [id number]  DATE: 2022  TIME: 10:00 AM    Assessment and Plan   Cough [R05 9]  1  Cough  Covid/Flu-Office Collect         Patient Instructions     Cough  COVID test sent   Tessalon Perles as needed for cough   Proventil inhaler refill provided  Follow up with PCP in 3-5 days  Proceed to  ER if symptoms worsen  Chief Complaint     Chief Complaint   Patient presents with    COVID-19 Exposure     Patient states she was exposed to covid on  in house; patient now has headache, cough, congestion, runny nose; denies fever    Patient is fully vaccinated     Medication Refill     Patient states she is out of albuterol inhaler and needs new prescription          History of Present Illness       71-year-old female who presents complaining of cough productive of white sputum x1 day  Patient states that she was exposed to her son who tested positive for COVID 5 days ago  Denies fevers, chills, shortness off breath      Review of Systems   Review of Systems   Constitutional: Negative for activity change, appetite change, chills, diaphoresis, fatigue and fever  HENT: Positive for congestion and rhinorrhea  Negative for ear discharge, ear pain, facial swelling, hearing loss, mouth sores, nosebleeds, postnasal drip, sinus pressure, sinus pain, sneezing, sore throat and voice change  Respiratory: Positive for cough  Negative for apnea, choking, chest tightness, shortness of breath, wheezing and stridor  Cardiovascular: Negative            Current Medications       Current Outpatient Medications:     albuterol (PROVENTIL HFA,VENTOLIN HFA) 90 mcg/act inhaler, Inhale 2 puffs every 4 (four) hours as needed for wheezing, Disp: 1 Inhaler, Rfl: 3    Multiple Vitamin-Folic Acid TABS, Take by mouth, Disp: , Rfl:     b complex vitamins capsule, Take 1 capsule by mouth daily, Disp: , Rfl:    cetirizine-pseudoephedrine (ZyrTEC-D) 5-120 MG per tablet, Take 1 tablet by mouth 2 (two) times a day, Disp: , Rfl:     Current Allergies     Allergies as of 09/14/2022 - Reviewed 09/14/2022   Allergen Reaction Noted    Erythromycin  03/17/2014    Penicillins  03/17/2014            The following portions of the patient's history were reviewed and updated as appropriate: allergies, current medications, past family history, past medical history, past social history, past surgical history and problem list      Past Medical History:   Diagnosis Date    Pulmonary disease        Past Surgical History:   Procedure Laterality Date    BLADDER SURGERY      HAND RECONSTRUCTION Bilateral 5/2019-11/2019       Family History   Problem Relation Age of Onset    Diabetes Family     No Known Problems Mother     No Known Problems Father     No Known Problems Daughter     No Known Problems Maternal Grandmother     No Known Problems Maternal Grandfather     Breast cancer Paternal Grandmother [de-identified]    No Known Problems Paternal Grandfather     No Known Problems Son     No Known Problems Paternal Aunt          Medications have been verified  Objective   Pulse (!) 110   Temp 97 7 °F (36 5 °C) (Temporal)   Resp 18   Ht 5' 4" (1 626 m)   Wt 112 kg (248 lb)   SpO2 96%   BMI 42 57 kg/m²        Physical Exam     Physical Exam  Constitutional:       General: She is not in acute distress  Appearance: Normal appearance  She is well-developed  She is not diaphoretic  HENT:      Head: Normocephalic and atraumatic  Right Ear: Hearing, tympanic membrane, ear canal and external ear normal       Left Ear: Hearing, tympanic membrane, ear canal and external ear normal       Nose: Rhinorrhea present  Mouth/Throat:      Pharynx: Uvula midline  Cardiovascular:      Rate and Rhythm: Normal rate and regular rhythm  Heart sounds: Normal heart sounds     Pulmonary:      Effort: Pulmonary effort is normal  No respiratory distress  Breath sounds: Normal breath sounds  No stridor  No wheezing, rhonchi or rales  Chest:      Chest wall: No tenderness  Musculoskeletal:      Cervical back: Normal range of motion and neck supple  Lymphadenopathy:      Cervical: Cervical adenopathy present  Neurological:      Mental Status: She is alert

## 2022-09-14 NOTE — PATIENT INSTRUCTIONS
Cough  COVID test sent   Tessalon Perles as needed for cough   Proventil inhaler refill provided  Follow up with PCP in 3-5 days  Proceed to  ER if symptoms worsen  [address]    Your healthcare provider and/or public health staff have evaluated you and have determined that you do not need to remain in the hospital at this time  At this time you can be isolated at home where you will be monitored by staff from your local or state health department  You should carefully follow the prevention and isolation steps below until a healthcare provider or local or state health department says that you can return to your normal activities  Stay home except to get medical care    People who are mildly ill with COVID-19 are able to isolate at home during their illness  You should restrict activities outside your home, except for getting medical care  Do not go to work, school, or public areas  Avoid using public transportation, ride-sharing, or taxis  Separate yourself from other people and animals in your home    People: As much as possible, you should stay in a specific room and away from other people in your home  Also, you should use a separate bathroom, if available  Animals: You should restrict contact with pets and other animals while you are sick with COVID-19, just like you would around other people  Although there have not been reports of pets or other animals becoming sick with COVID-19, it is still recommended that people sick with COVID-19 limit contact with animals until more information is known about the virus  When possible, have another member of your household care for your animals while you are sick  If you are sick with COVID-19, avoid contact with your pet, including petting, snuggling, being kissed or licked, and sharing food  If you must care for your pet or be around animals while you are sick, wash your hands before and after you interact with pets and wear a facemask  See COVID-19 and Animals for more information  Call ahead before visiting your doctor    If you have a medical appointment, call the healthcare provider and tell them that you have or may have COVID-19  This will help the healthcare providers office take steps to keep other people from getting infected or exposed  Wear a facemask    You should wear a facemask when you are around other people (e g , sharing a room or vehicle) or pets and before you enter a healthcare providers office  If you are not able to wear a facemask (for example, because it causes trouble breathing), then people who live with you should not stay in the same room with you, or they should wear a facemask if they enter your room  Cover your coughs and sneezes    Cover your mouth and nose with a tissue when you cough or sneeze  Throw used tissues in a lined trash can  Immediately wash your hands with soap and water for at least 20 seconds or, if soap and water are not available, clean your hands with an alcohol-based hand  that contains at least 60% alcohol  Clean your hands often    Wash your hands often with soap and water for at least 20 seconds, especially after blowing your nose, coughing, or sneezing; going to the bathroom; and before eating or preparing food  If soap and water are not readily available, use an alcohol-based hand  with at least 60% alcohol, covering all surfaces of your hands and rubbing them together until they feel dry  Soap and water are the best option if hands are visibly dirty  Avoid touching your eyes, nose, and mouth with unwashed hands  Avoid sharing personal household items    You should not share dishes, drinking glasses, cups, eating utensils, towels, or bedding with other people or pets in your home  After using these items, they should be washed thoroughly with soap and water      Clean all high-touch surfaces everyday    High touch surfaces include counters, tabletops, doorknobs, bathroom fixtures, toilets, phones, keyboards, tablets, and bedside tables  Also, clean any surfaces that may have blood, stool, or body fluids on them  Use a household cleaning spray or wipe, according to the label instructions  Labels contain instructions for safe and effective use of the cleaning product including precautions you should take when applying the product, such as wearing gloves and making sure you have good ventilation during use of the product  Monitor your symptoms    Seek prompt medical attention if your illness is worsening (e g , difficulty breathing)  Before seeking care, call your healthcare provider and tell them that you have, or are being evaluated for, COVID-19  Put on a facemask before you enter the facility  These steps will help the healthcare providers office to keep other people in the office or waiting room from getting infected or exposed  Ask your healthcare provider to call the local or [facility] health department  Persons who are placed under active monitoring or facilitated self-monitoring should follow instructions provided by their local health department or occupational health professionals, as appropriate  If you have a medical emergency and need to call 911, notify the dispatch personnel that you have, or are being evaluated for COVID-19  If possible, put on a facemask before emergency medical services arrive  Discontinuing home isolation    Patients with confirmed COVID-19 should remain under home isolation precautions until the following conditions are met:    They have had no fever for at least 24 hours (that is one full day of no fever without the use medicine that reduces fevers)  AND  other symptoms have improved (for example, when their cough or shortness of breath have improved)  AND  If had mild or moderate illness, at least 10 days have passed since their symptoms first appeared or if severe illness (needed oxygen) or immunosuppressed, at least 20 days have passed since symptoms first appeared  Patients with confirmed COVID-19 should also notify close contacts (including their workplace) and ask that they self-quarantine  Currently, close contact is defined as being within 6 feet for 15 minutes or more from the period 24 hours starting 48 hours before symptom onset to the time at which the patient went into isolation  Close contacts of patients diagnosed with COVID-19 should be instructed by the patient to self-quarantine for 14 days from the last time of their last contact with the patient       Source: [employer]

## 2022-09-15 LAB
FLUAV RNA RESP QL NAA+PROBE: NEGATIVE
FLUBV RNA RESP QL NAA+PROBE: NEGATIVE
SARS-COV-2 RNA RESP QL NAA+PROBE: POSITIVE

## 2022-10-04 ENCOUNTER — OFFICE VISIT (OUTPATIENT)
Dept: FAMILY MEDICINE CLINIC | Facility: CLINIC | Age: 52
End: 2022-10-04
Payer: COMMERCIAL

## 2022-10-04 VITALS
DIASTOLIC BLOOD PRESSURE: 78 MMHG | HEIGHT: 64 IN | TEMPERATURE: 96.9 F | RESPIRATION RATE: 16 BRPM | BODY MASS INDEX: 43.54 KG/M2 | HEART RATE: 84 BPM | WEIGHT: 255 LBS | SYSTOLIC BLOOD PRESSURE: 134 MMHG | OXYGEN SATURATION: 98 %

## 2022-10-04 DIAGNOSIS — J45.30 MILD PERSISTENT ASTHMA WITHOUT COMPLICATION: Primary | ICD-10-CM

## 2022-10-04 DIAGNOSIS — J32.9 SINUSITIS, UNSPECIFIED CHRONICITY, UNSPECIFIED LOCATION: ICD-10-CM

## 2022-10-04 DIAGNOSIS — J40 BRONCHITIS: ICD-10-CM

## 2022-10-04 PROCEDURE — 99214 OFFICE O/P EST MOD 30 MIN: CPT | Performed by: INTERNAL MEDICINE

## 2022-10-04 RX ORDER — PREDNISONE 10 MG/1
TABLET ORAL
Qty: 20 TABLET | Refills: 0 | Status: SHIPPED | OUTPATIENT
Start: 2022-10-04

## 2022-10-04 RX ORDER — CEFPROZIL 500 MG/1
500 TABLET, FILM COATED ORAL 2 TIMES DAILY
Qty: 20 TABLET | Refills: 0 | Status: SHIPPED | OUTPATIENT
Start: 2022-10-04 | End: 2022-10-14

## 2022-10-04 RX ORDER — FLUTICASONE PROPIONATE AND SALMETEROL XINAFOATE 230; 21 UG/1; UG/1
1 AEROSOL, METERED RESPIRATORY (INHALATION) 2 TIMES DAILY
Qty: 12 G | Refills: 0 | Status: SHIPPED | OUTPATIENT
Start: 2022-10-04

## 2022-10-04 RX ORDER — ALBUTEROL SULFATE 90 UG/1
2 AEROSOL, METERED RESPIRATORY (INHALATION) EVERY 4 HOURS PRN
Qty: 18 G | Refills: 1 | Status: SHIPPED | OUTPATIENT
Start: 2022-10-04

## 2022-10-04 RX ORDER — AZELASTINE 1 MG/ML
1 SPRAY, METERED NASAL 2 TIMES DAILY
COMMUNITY

## 2022-10-04 NOTE — PROGRESS NOTES
BMI Counseling: Body mass index is 43 77 kg/m²  The BMI is above normal  Nutrition recommendations include decreasing portion sizes and limiting drinks that contain sugar  Exercise recommendations include exercising 3-5 times per week  No pharmacotherapy was ordered  Patient referred to PCP  Rationale for BMI follow-up plan is due to patient being overweight or obese  Depression Screening and Follow-up Plan: Patient was screened for depression during today's encounter  They screened negative with a PHQ-2 score of 0  Assessment/Plan:         Diagnoses and all orders for this visit:    Mild persistent asthma without complication  -     albuterol (ProAir HFA) 90 mcg/act inhaler; Inhale 2 puffs every 4 (four) hours as needed for wheezing  -     fluticasone-salmeterol (Advair HFA) 230-21 MCG/ACT inhaler; Inhale 1 puff 2 (two) times a day Rinse mouth after use  -     predniSONE 10 mg tablet; 4 tabs once daily x 2 days then 3 tabs once daily x 2 days then2 tabs once daily x 2 days then one tab once daily x 2 days    Sinusitis, unspecified chronicity, unspecified location  -     cefprosil (CEFZIL) 500 MG tablet; Take 1 tablet (500 mg total) by mouth 2 (two) times a day for 10 days She can tolerate cefzil    Bronchitis  -     cefprosil (CEFZIL) 500 MG tablet; Take 1 tablet (500 mg total) by mouth 2 (two) times a day for 10 days She can tolerate cefzil    Other orders  -     Dextromethorphan-guaiFENesin (MUCINEX DM PO); Take by mouth  -     azelastine (ASTELIN) 0 1 % nasal spray; 1 spray into each nostril 2 (two) times a day Use in each nostril as directed          Subjective:      Patient ID: Dwayne Sutherland is a 47 y o  female  +complains of head hurting, coughing +[place], +sore throat ear ach +congestion denies diarrhea  Started sept 13th  +son +covid and she was + on sept 14th  She went back 19th  +fever on 2nd day 100 2  She feels her asthma is falring  +using inhaler for her asthma    Given albuterol and tessalon perles at urgent care  Not treated with with antiviral   Does not feel resolving  Cough  This is a recurrent problem  The current episode started 1 to 4 weeks ago  The problem has been unchanged  The problem occurs hourly  The cough is non-productive  Associated symptoms include nasal congestion, postnasal drip and shortness of breath  Pertinent negatives include no chest pain, chills, ear congestion, ear pain, fever, headaches, heartburn, hemoptysis, myalgias, rash, rhinorrhea, sore throat, sweats, weight loss or wheezing  The symptoms are aggravated by exercise  Risk factors for lung disease include animal exposure  The following portions of the patient's history were reviewed and updated as appropriate: She  has a past medical history of Pulmonary disease  She   Patient Active Problem List    Diagnosis Date Noted    Axillary pain, left 12/02/2021    Adverse effect of mRNA COVID-19 vaccine 12/02/2021    Increased risk of breast cancer 12/01/2021    Dense breast tissue on mammogram 12/01/2021    BMI 40 0-44 9, adult ([facility] [address] ) 12/01/2021    Mild persistent asthma without complication 08/14/4101    Dense breast 08/09/2019     She  has a past surgical history that includes Bladder surgery and Hand reconstruction (Bilateral, 5/2019-11/2019)  Her family history includes Breast cancer (age of onset: [de-identified]) in her paternal grandmother; Diabetes in her family; No Known Problems in her daughter, father, maternal grandfather, maternal grandmother, mother, paternal aunt, paternal grandfather, and son  She  reports that she has never smoked  She has never used smokeless tobacco  She reports current alcohol use  She reports that she does not use drugs    Current Outpatient Medications   Medication Sig Dispense Refill    albuterol (ProAir HFA) 90 mcg/act inhaler Inhale 2 puffs every 6 (six) hours as needed for shortness of breath 8 5 g 0    albuterol (ProAir HFA) 90 mcg/act inhaler Inhale 2 puffs every 4 (four) hours as needed for wheezing 18 g 1    azelastine (ASTELIN) 0 1 % nasal spray 1 spray into each nostril 2 (two) times a day Use in each nostril as directed      b complex vitamins capsule Take 1 capsule by mouth daily      benzonatate (TESSALON PERLES) 100 mg capsule Take 1 capsule (100 mg total) by mouth 3 (three) times a day as needed for cough 20 capsule 0    cefprosil (CEFZIL) 500 MG tablet Take 1 tablet (500 mg total) by mouth 2 (two) times a day for 10 days She can tolerate cefzil 20 tablet 0    cetirizine-pseudoephedrine (ZyrTEC-D) 5-120 MG per tablet Take 1 tablet by mouth 2 (two) times a day      Dextromethorphan-guaiFENesin (MUCINEX DM PO) Take by mouth      fluticasone-salmeterol (Advair HFA) 230-21 MCG/ACT inhaler Inhale 1 puff 2 (two) times a day Rinse mouth after use  12 g 0    Multiple Vitamin-Folic Acid TABS Take by mouth      predniSONE 10 mg tablet 4 tabs once daily x 2 days then 3 tabs once daily x 2 days then2 tabs once daily x 2 days then one tab once daily x 2 days 20 tablet 0    albuterol (PROVENTIL HFA,VENTOLIN HFA) 90 mcg/act inhaler Inhale 2 puffs every 4 (four) hours as needed for wheezing (Patient not taking: Reported on 10/4/2022) 1 Inhaler 3     No current facility-administered medications for this visit       Current Outpatient Medications on File Prior to Visit   Medication Sig    albuterol (ProAir HFA) 90 mcg/act inhaler Inhale 2 puffs every 6 (six) hours as needed for shortness of breath    azelastine (ASTELIN) 0 1 % nasal spray 1 spray into each nostril 2 (two) times a day Use in each nostril as directed    b complex vitamins capsule Take 1 capsule by mouth daily    benzonatate (TESSALON PERLES) 100 mg capsule Take 1 capsule (100 mg total) by mouth 3 (three) times a day as needed for cough    cetirizine-pseudoephedrine (ZyrTEC-D) 5-120 MG per tablet Take 1 tablet by mouth 2 (two) times a day    Dextromethorphan-guaiFENesin (MUCINEX DM PO) Take by mouth    Multiple Vitamin-Folic Acid TABS Take by mouth    albuterol (PROVENTIL HFA,VENTOLIN HFA) 90 mcg/act inhaler Inhale 2 puffs every 4 (four) hours as needed for wheezing (Patient not taking: Reported on 10/4/2022)     No current facility-administered medications on file prior to visit  She is allergic to erythromycin and penicillins       Review of Systems   Constitutional: Negative for chills, fever and weight loss  HENT: Positive for postnasal drip  Negative for ear pain, rhinorrhea and sore throat  Respiratory: Positive for cough and shortness of breath  Negative for hemoptysis and wheezing  Cardiovascular: Negative for chest pain  Gastrointestinal: Negative for heartburn  Musculoskeletal: Negative for myalgias  Skin: Negative for rash  Neurological: Negative for headaches  Objective:      /78 (BP Location: Right arm, Patient Position: Sitting, Cuff Size: Large)   Pulse 84   Temp (!) 96 9 °F (36 1 °C) (Temporal)   Resp 16   Ht 5' 4" (1 626 m)   Wt 116 kg (255 lb)   SpO2 98%   BMI 43 77 kg/m²          Physical Exam  Constitutional:       Appearance: She is obese  HENT:      Head: Normocephalic and atraumatic  Right Ear: Tympanic membrane, ear canal and external ear normal       Left Ear: Tympanic membrane, ear canal and external ear normal    Cardiovascular:      Rate and Rhythm: Normal rate and regular rhythm  Pulmonary:      Breath sounds: Wheezing and rhonchi present  Musculoskeletal:      Cervical back: Neck supple  Lymphadenopathy:      Cervical: No cervical adenopathy  Neurological:      Mental Status: She is alert

## 2022-11-03 ENCOUNTER — HOSPITAL ENCOUNTER (OUTPATIENT)
Dept: RADIOLOGY | Facility: HOSPITAL | Age: 52
Discharge: HOME | End: 2022-11-03

## 2022-11-03 DIAGNOSIS — Z86.16 POST-COVID SYNDROME RESOLVED: ICD-10-CM

## 2022-11-08 ENCOUNTER — ANNUAL EXAM (OUTPATIENT)
Dept: OBGYN CLINIC | Facility: MEDICAL CENTER | Age: 52
End: 2022-11-08

## 2022-11-08 VITALS
HEIGHT: 64 IN | BODY MASS INDEX: 43.36 KG/M2 | DIASTOLIC BLOOD PRESSURE: 90 MMHG | SYSTOLIC BLOOD PRESSURE: 146 MMHG | WEIGHT: 254 LBS

## 2022-11-08 DIAGNOSIS — N95.1 PERIMENOPAUSAL: ICD-10-CM

## 2022-11-08 DIAGNOSIS — R92.2 DENSE BREAST TISSUE ON MAMMOGRAM: ICD-10-CM

## 2022-11-08 DIAGNOSIS — Z91.89 INCREASED RISK OF BREAST CANCER: ICD-10-CM

## 2022-11-08 DIAGNOSIS — Z01.419 ROUTINE GYNECOLOGICAL EXAMINATION: Primary | ICD-10-CM

## 2022-11-08 RX ORDER — BECLOMETHASONE DIPROPIONATE HFA 40 UG/1
AEROSOL, METERED RESPIRATORY (INHALATION)
COMMUNITY
Start: 2022-10-31

## 2022-11-08 NOTE — PROGRESS NOTES
Assessment   47 y o  [id number] presenting for annual exam      Plan   Diagnoses and all orders for this visit:    Routine gynecological examination  Normal findings on routine exam   Encouraged 150 min of exercise per week  Reviewed balanced diet  Multivitamin encouraged   Breast awareness/SBE encouraged     Dense breast tissue on mammogram    Discussed that based on her lifetime risk and breast density she may benefit from additional screening with breast MRI  Has had this previously, last done in 2019  Was recently ordered ABUS but does not plan on obtaining this due to not seeing reasoning for additional screen  We reviewed different modalities of mammogram, ultrasound, and MRI  We reviewed general screening guidelines and reasoning for supplemental screening  Discussed this is not always covered by insurance and I recommend checking coverage first to determine potential out of pocket cost  She will consider this  Declines order  Would like to await results of mammogram this coming spring and plan from there  Aware she can call office if desires order for MRI going forward  Increased risk of breast cancer    Perimenopausal    Recommended black cohosh for lessening hot flash symptoms  Reviewed taking alone or in combination menopausal supplements  Reviewed OTC medications to help with sleep disturbance (tylenol pm, melatonin, valerian or Unisom)  To follow package directions and take only when there are 8 hours available to sleep  Recommended to keep room temperature cool at night  Wear light weight clothing in layers to bed  To try these measures for 8 weeks and if no improvement in QOL RTO to discuss other tx options  Also discussed option to be seen in [facility]  Pap due 2026  Mammo slip given   Colonoscopy due 2024      RTO one year for yearly exam or sooner as needed      __________________________________________________________________    Claribel plaza a 47 y o  [id number] presenting for annual exam     Son is 21 and daughter 13  They are well  Has had some trouble getting asthma under control since having covid in September  She is not exercising as a result of this  She is unhappy with her weight currently, but feels she needs to get this under control before she can focus on her weight  SCREENING  Last Pap: 2021 NILM/neg  Last Mammo: 2022 BIRADS 1 - Negative  Last Colonoscopy: 2021 Cologuard negative     GYN  Periods are spacing out some (longest interval was 3 months) and are becoming lighter  They last a few days  Associated with mild cramping and some premenstrual breast tenderness, otherwise no complaints  Perimenopausal sx: + occasional VM - not treating but are bothersome  Sexually active: Yes - single partner -   Complaints: vaginal dryness managed with lubricant     Hx Abnormal pap: denies  We reviewed ASCCP guidelines for Pap testing today  Denies vaginal discharge, itching, odor, dyspareunia, pelvic pain and vulvar/vaginal symptoms      OB         Complaints: denies   Denies urgency, frequency, hematuria, leakage / change in stream, difficulty urinating  BREAST  Complaints: denies   Denies: breast lump, breast tenderness, nipple discharge, skin color change, and skin lesion(s)  Personal hx: dense tissue     Pertinent Family Hx:   Family hx of breast cancer: no  Family hx of ovarian cancer: no  Family hx of colon cancer: no    GENERAL  PMH reviewed/updated and is as below  Patient does follow with a PCP      Past Medical History:   Diagnosis Date   • Asthma Birth   • Pulmonary disease        Past Surgical History:   Procedure Laterality Date   • BLADDER SURGERY     • HAND RECONSTRUCTION Bilateral 2019-2019         Current Outpatient Medications:   •  albuterol (ProAir HFA) 90 mcg/act inhaler, Inhale 2 puffs every 4 (four) hours as needed for wheezing, Disp: 18 g, Rfl: 1  •  azelastine (ASTELIN) 0 1 % nasal spray, 1 spray into each nostril 2 (two) times a day Use in each nostril as directed, Disp: , Rfl:   •  b complex vitamins capsule, Take 1 capsule by mouth daily, Disp: , Rfl:   •  beclomethasone (Qvar RediHaler) 40 MCG/ACT inhaler, , Disp: , Rfl:   •  cetirizine-pseudoephedrine (ZyrTEC-D) 5-120 MG per tablet, Take 1 tablet by mouth 2 (two) times a day, Disp: , Rfl:   •  Multiple Vitamin-Folic Acid TABS, Take by mouth, Disp: , Rfl:   •  albuterol (ProAir HFA) 90 mcg/act inhaler, Inhale 2 puffs every 6 (six) hours as needed for shortness of breath (Patient not taking: Reported on 11/8/2022), Disp: 8 5 g, Rfl: 0  •  albuterol (PROVENTIL HFA,VENTOLIN HFA) 90 mcg/act inhaler, Inhale 2 puffs every 4 (four) hours as needed for wheezing (Patient not taking: No sig reported), Disp: 1 Inhaler, Rfl: 3  •  benzonatate (TESSALON PERLES) 100 mg capsule, Take 1 capsule (100 mg total) by mouth 3 (three) times a day as needed for cough, Disp: 20 capsule, Rfl: 0  •  Dextromethorphan-guaiFENesin (MUCINEX DM PO), Take by mouth, Disp: , Rfl:   •  fluticasone-salmeterol (Advair HFA) 230-21 MCG/ACT inhaler, Inhale 1 puff 2 (two) times a day Rinse mouth after use , Disp: 12 g, Rfl: 0  •  predniSONE 10 mg tablet, 4 tabs once daily x 2 days then 3 tabs once daily x 2 days then2 tabs once daily x 2 days then one tab once daily x 2 days, Disp: 20 tablet, Rfl: 0    Allergies   Allergen Reactions   • Erythromycin    • Penicillins        Social History     Socioeconomic History   • Marital status: /Civil Union     Spouse name: Not on file   • Number of children: Not on file   • Years of education: Not on file   • Highest education level: Not on file   Occupational History   • Not on file   Tobacco Use   • Smoking status: Never Smoker   • Smokeless tobacco: Never Used   Vaping Use   • Vaping Use: Never used   Substance and Sexual Activity   • Alcohol use:  Yes     Alcohol/week: 1 0 standard drink     Types: 1 Glasses of wine per week     Comment: social   • Drug use: No   • Sexual activity: Yes     Partners: Male     Birth control/protection: Condom Male   Other Topics Concern   • Not on file   Social History Narrative   • Not on file     Social Determinants of Health     Financial Resource Strain: Not on file   Food Insecurity: Not on file   Transportation Needs: Not on file   Physical Activity: Not on file   Stress: Not on file   Social Connections: Not on file   Intimate Partner Violence: Not on file   Housing Stability: Not on file       Review of Systems     ROS:  Constitutional: Negative for fatigue and unexpected weight change  Respiratory: Negative for cough and shortness of breath  Cardiovascular: Negative for chest pain and palpitations  Gastrointestinal: Negative for abdominal pain and change in bowel habits  Breasts:  Negative, other than as noted above  Genitourinary: Negative, other than as noted above  Psychiatric: Negative for mood difficulties  Objective      /90 (BP Location: Left arm, Patient Position: Sitting, Cuff Size: Large)   Ht 5' 3 75" (1 619 m)   Wt 115 kg (254 lb)   LMP 10/24/2022   BMI 43 94 kg/m²     Physical Examination:    Patient appears well and is not in distress  Obese  Neck is supple without masses  Breasts are symmetrical without mass, tenderness, nipple discharge, skin changes or adenopathy  Abdomen is soft and nontender without masses  External genitals are normal without lesions or rashes  Urethral meatus and urethra are normal  Bladder is normal to palpation  Vagina is normal without discharge or bleeding  Cervix is normal without discharge or lesion  Uterus is normal, mobile, nontender without palpable mass  Adnexa are normal, nontender, without palpable mass

## 2022-11-08 NOTE — PATIENT INSTRUCTIONS
Black cohosh for lessening hot flash symptoms  Remifemin and Lawson Bhatia are two supplements that contain this  Consider one of the following for sleep disturbance-tylenol pm, melatonin, valerian or Unisom  Follow package directions and take only when there are 8 hours available to sleep  Keep room temperature cool at night  Wear light weight clothing in layers to bed

## 2022-11-10 ENCOUNTER — OFFICE VISIT (OUTPATIENT)
Dept: FAMILY MEDICINE CLINIC | Facility: CLINIC | Age: 52
End: 2022-11-10

## 2022-11-10 VITALS
BODY MASS INDEX: 43.36 KG/M2 | WEIGHT: 254 LBS | OXYGEN SATURATION: 95 % | RESPIRATION RATE: 16 BRPM | SYSTOLIC BLOOD PRESSURE: 150 MMHG | DIASTOLIC BLOOD PRESSURE: 90 MMHG | HEART RATE: 85 BPM | HEIGHT: 64 IN | TEMPERATURE: 97.1 F

## 2022-11-10 DIAGNOSIS — R03.0 ELEVATED BLOOD PRESSURE READING: Primary | ICD-10-CM

## 2022-11-10 DIAGNOSIS — J98.11 ATELECTASIS: ICD-10-CM

## 2022-11-10 RX ORDER — BLOOD PRESSURE TEST KIT
KIT MISCELLANEOUS DAILY
Qty: 1 KIT | Refills: 0 | Status: SHIPPED | OUTPATIENT
Start: 2022-11-10

## 2022-11-10 RX ORDER — AMLODIPINE BESYLATE 10 MG/1
10 TABLET ORAL DAILY
Qty: 90 TABLET | Refills: 0 | Status: SHIPPED | OUTPATIENT
Start: 2022-11-10

## 2022-11-10 NOTE — LETTER
November 10, 2022     Patient: Anil Abbott  YOB: 1970  Date of Visit: 11/10/2022      To Whom it May Concern:    Anil Abbott is under my professional care  Richardson Azar was seen in my office on 11/10/2022  Richardson Azar may return to work on 11/11/22  If you have any questions or concerns, please don't hesitate to call           Sincerely,          Aparna Narayan MD        CC: No Recipients

## 2022-11-10 NOTE — PROGRESS NOTES
Name: Shamir Mendoza      : 1970      MRN: [id number]  Encounter Provider: Alia Nelson MD  Encounter Date: 11/10/2022   Encounter department: [address] [place] [address]  Elevated blood pressure reading  Assessment & Plan:  Patient with elevated blood pressure reading in the specialist's office prior to pulmonary function test  Of note patient recently is recovering from COVID-19, has baseline respiratory symptom requiring Qvar and albuterol inhaler and has been using Zyrtec D for treatment of her upper respiratory symptoms  Discontinue size tech D at this time  After discussion with patient, will start treatment of elevated blood pressure with amlodipine 10 mg  The elevated blood pressure may be secondary to COVID-19 as well as medication side effect, we may consider discontinue medication in 3-6 months of her blood pressure normalize    Orders:  -     Blood Pressure KIT; Use in the morning    2  Atelectasis  -     Respiratory Therapy Supplies (Spirometer) KIT; Use every 4 (four) hours         Subjective      HPI     80-year-old female patient presents for follow-up after elevated blood pressure at a specialist's office  Patient went to pulmonology for pulmonary function test today  Before pulmonary function test patient's blood pressure is elevated, 158/102  Repeat blood pressure to other times, numbers were 180/101 78/100  Patient's blood pressure today in the office 154/1 4  Denies any history of previous elevated blood pressure  Patient was last seen by PCP on 10/04/2022, blood pressure was 134/78  Patient has never been blood pressure medication  Does continues to have some residual COVID symptoms  She has been taking medication including dextromethorphan for her symptoms  Last dosage or Zyrtec D was last night  Repeat blood pressure in the end of office visit 150/90      Review of Systems   Constitutional: Negative for chills and fever     HENT: Positive for congestion  Negative for rhinorrhea and sore throat  Respiratory: Positive for cough  Negative for chest tightness and shortness of breath  Cardiovascular: Negative for chest pain  Gastrointestinal: Negative for abdominal pain  Allergic/Immunologic: Positive for environmental allergies  Neurological: Negative for dizziness, light-headedness and headaches  Current Outpatient Medications on File Prior to Visit   Medication Sig   • albuterol (ProAir HFA) 90 mcg/act inhaler Inhale 2 puffs every 4 (four) hours as needed for wheezing   • b complex vitamins capsule Take 1 capsule by mouth daily   • beclomethasone (Qvar RediHaler) 40 MCG/ACT inhaler    • Multiple Vitamin-Folic Acid TABS Take by mouth   • Triamcinolone Acetonide (NASACORT AQ NA) into each nostril   • albuterol (ProAir HFA) 90 mcg/act inhaler Inhale 2 puffs every 6 (six) hours as needed for shortness of breath (Patient not taking: Reported on 11/8/2022)   • albuterol (PROVENTIL HFA,VENTOLIN HFA) 90 mcg/act inhaler Inhale 2 puffs every 4 (four) hours as needed for wheezing (Patient not taking: No sig reported)   • azelastine (ASTELIN) 0 1 % nasal spray 1 spray into each nostril 2 (two) times a day Use in each nostril as directed (Patient not taking: Reported on 11/10/2022)   • benzonatate (TESSALON PERLES) 100 mg capsule Take 1 capsule (100 mg total) by mouth 3 (three) times a day as needed for cough   • cetirizine-pseudoephedrine (ZyrTEC-D) 5-120 MG per tablet Take 1 tablet by mouth 2 (two) times a day   • fluticasone-salmeterol (Advair HFA) 230-21 MCG/ACT inhaler Inhale 1 puff 2 (two) times a day Rinse mouth after use     • predniSONE 10 mg tablet 4 tabs once daily x 2 days then 3 tabs once daily x 2 days then2 tabs once daily x 2 days then one tab once daily x 2 days   • [DISCONTINUED] Dextromethorphan-guaiFENesin (MUCINEX DM PO) Take by mouth       Objective     BP (!) 154/104 (BP Location: Left arm, Patient Position: Sitting, Cuff Size: Large)   Pulse 85   Temp (!) 97 1 °F (36 2 °C) (Temporal)   Resp 16   Ht 5' 3 75" (1 619 m)   Wt 115 kg (254 lb)   LMP 10/24/2022   SpO2 95%   BMI 43 94 kg/m²     Physical Exam  Vitals reviewed  Constitutional:       General: She is not in acute distress  Appearance: Normal appearance  She is obese  She is not ill-appearing, toxic-appearing or diaphoretic  Cardiovascular:      Rate and Rhythm: Normal rate and regular rhythm  Pulses: Normal pulses  Heart sounds: Normal heart sounds  No murmur heard  Pulmonary:      Effort: Pulmonary effort is normal  No respiratory distress  Breath sounds: Normal breath sounds  Abdominal:      General: Abdomen is flat  Musculoskeletal:         General: No swelling or tenderness  Normal range of motion  Skin:     General: Skin is warm and dry  Capillary Refill: Capillary refill takes less than 2 seconds  Coloration: Skin is not jaundiced  Neurological:      General: No focal deficit present  Mental Status: She is alert and oriented to person, place, and time  Mental status is at baseline  Cranial Nerves: No cranial nerve deficit     Psychiatric:         Mood and Affect: Mood normal               Frank Lin MD

## 2022-11-10 NOTE — ASSESSMENT & PLAN NOTE
Patient with elevated blood pressure reading in the specialist's office prior to pulmonary function test  Of note patient recently is recovering from COVID-19, has baseline respiratory symptom requiring Qvar and albuterol inhaler and has been using Zyrtec D for treatment of her upper respiratory symptoms  Discontinue size tech D at this time  After discussion with patient, will start treatment of elevated blood pressure with amlodipine 10 mg  The elevated blood pressure may be secondary to COVID-19 as well as medication side effect, we may consider discontinue medication in 3-6 months of her blood pressure normalize

## 2022-11-30 ENCOUNTER — TELEPHONE (OUTPATIENT)
Dept: FAMILY MEDICINE CLINIC | Facility: CLINIC | Age: 52
End: 2022-11-30

## 2022-11-30 NOTE — TELEPHONE ENCOUNTER
Patient called, wanted to know if she could take advil or tylenol with her bp med if she were to get a headache  Also was questioning her bp at what it should range? She is averaging 150-160 /80-90  Per dr aHzel Christie - patient can take tylenol or advil with onset of headache  Also he would like her bp under 140/90, but can discuss at her appt on 12/22  If it does get 160/100 or higher for 3 days straight to call for sooner appt

## 2022-12-15 ENCOUNTER — RA CDI HCC (OUTPATIENT)
Dept: OTHER | Facility: HOSPITAL | Age: 52
End: 2022-12-15

## 2022-12-15 NOTE — PROGRESS NOTES
[facility]  coding opportunities       Chart reviewed, no opportunity found: CHART REVIEWED, NO OPPORTUNITY FOUND        Patients Insurance        Commercial Insurance: [address] [place] [address]

## 2022-12-22 ENCOUNTER — OFFICE VISIT (OUTPATIENT)
Dept: FAMILY MEDICINE CLINIC | Facility: CLINIC | Age: 52
End: 2022-12-22

## 2022-12-22 VITALS
WEIGHT: 251.4 LBS | OXYGEN SATURATION: 98 % | TEMPERATURE: 97.8 F | DIASTOLIC BLOOD PRESSURE: 86 MMHG | HEART RATE: 88 BPM | BODY MASS INDEX: 42.92 KG/M2 | HEIGHT: 64 IN | SYSTOLIC BLOOD PRESSURE: 146 MMHG

## 2022-12-22 DIAGNOSIS — I15.8 OTHER SECONDARY HYPERTENSION: ICD-10-CM

## 2022-12-22 DIAGNOSIS — J45.30 MILD PERSISTENT ASTHMA WITHOUT COMPLICATION: Primary | ICD-10-CM

## 2022-12-22 DIAGNOSIS — Z00.00 ANNUAL PHYSICAL EXAM: ICD-10-CM

## 2022-12-22 DIAGNOSIS — U09.9 POST-COVID-19 SYNDROME: ICD-10-CM

## 2022-12-22 NOTE — PATIENT INSTRUCTIONS
Consider taking the amlodipine 5mg twice a day instead of 10mg all at once  Hold off on COVID vaccine until post COVID issues have resolved, mammo in April, COVID vaccine needs to be 4 weeks or more from the [employer] Visit for Adults   AMBULATORY CARE:   A wellness visit  is when you see your healthcare provider to get screened for health problems  Your healthcare provider will also give you advice on how to stay healthy  Write down your questions so you remember to ask them  Ask your healthcare provider how often you should have a wellness visit  What happens at a wellness visit:  Your healthcare provider will ask about your health, and your family history of health problems  This includes high blood pressure, heart disease, and cancer  He or she will ask if you have symptoms that concern you, if you smoke, and about your mood  You may also be asked about your intake of medicines, supplements, food, and alcohol  Any of the following may be done: Your weight  will be checked  Your height may also be checked so your body mass index (BMI) can be calculated  Your BMI shows if you are at a healthy weight  Your blood pressure  and heart rate will be checked  Your temperature may also be checked  Blood and urine tests  may be done  Blood tests may be done to check your cholesterol levels  Abnormal cholesterol levels increase your risk for heart disease and stroke  You may also need a blood or urine test to check for diabetes if you are at increased risk  Urine tests may be done to look for signs of an infection or kidney disease  A physical exam  includes checking your heartbeat and lungs with a stethoscope  Your healthcare provider may also check your skin to look for sun damage  Screening tests  may be recommended  A screening test is done to check for diseases that may not cause symptoms  The screening tests you may need depend on your age, gender, family history, and lifestyle habits   For example, colorectal screening may be recommended if you are 48years old or older  Screening tests you need if you are a woman:   A Pap smear  is used to screen for cervical cancer  Pap smears are usually done every 3 to 5 years depending on your age  You may need them more often if you have had abnormal Pap smear test results in the past  Ask your healthcare provider how often you should have a Pap smear  A mammogram  is an x-ray of your breasts to screen for breast cancer  Experts recommend mammograms every 2 years starting at age 48 years  You may need a mammogram at age 52 years or younger if you have an increased risk for breast cancer  Talk to your healthcare provider about when you should start having mammograms and how often you need them  Vaccines you may need:   Get an influenza vaccine  every year  The influenza vaccine protects you from the flu  Several types of viruses cause the flu  The viruses change over time, so new vaccines are made each year  Get a tetanus-diphtheria (Td) booster vaccine  every 10 years  This vaccine protects you against tetanus and diphtheria  Tetanus is a severe infection that may cause painful muscle spasms and lockjaw  Diphtheria is a severe bacterial infection that causes a thick covering in the back of your mouth and throat  Get a human papillomavirus (HPV) vaccine  if you are female and aged 23 to 32 or male 23 to 24 and never received it  This vaccine protects you from HPV infection  HPV is the most common infection spread by sexual contact  HPV may also cause vaginal, penile, and anal cancers  Get a pneumococcal vaccine  if you are aged 72 years or older  The pneumococcal vaccine is an injection given to protect you from pneumococcal disease  Pneumococcal disease is an infection caused by pneumococcal bacteria  The infection may cause pneumonia, meningitis, or an ear infection      Get a shingles vaccine  if you are 60 or older, even if you have had shingles before  The shingles vaccine is an injection to protect you from the varicella-zoster virus  This is the same virus that causes chickenpox  Shingles is a painful rash that develops in people who had chickenpox or have been exposed to the virus  How to eat healthy:  My Plate is a model for planning healthy meals  It shows the types and amounts of foods that should go on your plate  Fruits and vegetables make up about half of your plate, and grains and protein make up the other half  A serving of dairy is included on the side of your plate  The amount of calories and serving sizes you need depends on your age, gender, weight, and height  Examples of healthy foods are listed below:  Eat a variety of vegetables  such as dark green, red, and orange vegetables  You can also include canned vegetables low in sodium (salt) and frozen vegetables without added butter or sauces  Eat a variety of fresh fruits , canned fruit in 100% juice, frozen fruit, and dried fruit  Include whole grains  At least half of the grains you eat should be whole grains  Examples include whole-wheat bread, wheat pasta, brown rice, and whole-grain cereals such as oatmeal     Eat a variety of protein foods such as seafood (fish and shellfish), lean meat, and poultry without skin (turkey and chicken)  Examples of lean meats include pork leg, shoulder, or tenderloin, and beef round, sirloin, tenderloin, and extra lean ground beef  Other protein foods include eggs and egg substitutes, beans, peas, soy products, nuts, and seeds  Choose low-fat dairy products such as skim or 1% milk or low-fat yogurt, cheese, and cottage cheese  Limit unhealthy fats  such as butter, hard margarine, and shortening  Exercise:  Exercise at least 30 minutes per day on most days of the week  Some examples of exercise include walking, biking, dancing, and swimming   You can also fit in more physical activity by taking the stairs instead of the elevator or parking farther away from stores  Include muscle strengthening activities 2 days each week  Regular exercise provides many health benefits  It helps you manage your weight, and decreases your risk for type 2 diabetes, heart disease, stroke, and high blood pressure  Exercise can also help improve your mood  Ask your healthcare provider about the best exercise plan for you  General health and safety guidelines:   Do not smoke  Nicotine and other chemicals in cigarettes and cigars can cause lung damage  Ask your healthcare provider for information if you currently smoke and need help to quit  E-cigarettes or smokeless tobacco still contain nicotine  Talk to your healthcare provider before you use these products  Limit alcohol  A drink of alcohol is 12 ounces of beer, 5 ounces of wine, or 1½ ounces of liquor  Lose weight, if needed  Being overweight increases your risk of certain health conditions  These include heart disease, high blood pressure, type 2 diabetes, and certain types of cancer  Protect your skin  Do not sunbathe or use tanning beds  Use sunscreen with a SPF 15 or higher  Apply sunscreen at least 15 minutes before you go outside  Reapply sunscreen every 2 hours  Wear protective clothing, hats, and sunglasses when you are outside  Drive safely  Always wear your seatbelt  Make sure everyone in your car wears a seatbelt  A seatbelt can save your life if you are in an accident  Do not use your cell phone when you are driving  This could distract you and cause an accident  Pull over if you need to make a call or send a text message  Practice safe sex  Use latex condoms if are sexually active and have more than one partner  Your healthcare provider may recommend screening tests for sexually transmitted infections (STIs)  Wear helmets, lifejackets, and protective gear    Always wear a helmet when you ride a bike or motorcycle, go skiing, or play sports that could cause a head injury  Wear protective equipment when you play sports  Wear a lifejacket when you are on a boat or doing water sports  © Copyright [employer] 2022 Information is for End User's use only and may not be sold, redistributed or otherwise used for commercial purposes  All illustrations and images included in CareNotes® are the copyrighted property of [employer]  or Ruben Fernandez   The above information is an  only  It is not intended as medical advice for individual conditions or treatments  Talk to your doctor, nurse or pharmacist before following any medical regimen to see if it is safe and effective for you  Wellness Visit for Adults   AMBULATORY CARE:   A wellness visit  is when you see your healthcare provider to get screened for health problems  Your healthcare provider will also give you advice on how to stay healthy  Write down your questions so you remember to ask them  Ask your healthcare provider how often you should have a wellness visit  What happens at a wellness visit:  Your healthcare provider will ask about your health, and your family history of health problems  This includes high blood pressure, heart disease, and cancer  He or she will ask if you have symptoms that concern you, if you smoke, and about your mood  You may also be asked about your intake of medicines, supplements, food, and alcohol  Any of the following may be done: Your weight  will be checked  Your height may also be checked so your body mass index (BMI) can be calculated  Your BMI shows if you are at a healthy weight  Your blood pressure  and heart rate will be checked  Your temperature may also be checked  Blood and urine tests  may be done  Blood tests may be done to check your cholesterol levels  Abnormal cholesterol levels increase your risk for heart disease and stroke  You may also need a blood or urine test to check for diabetes if you are at increased risk   Urine tests may be done to look for signs of an infection or kidney disease  A physical exam  includes checking your heartbeat and lungs with a stethoscope  Your healthcare provider may also check your skin to look for sun damage  Screening tests  may be recommended  A screening test is done to check for diseases that may not cause symptoms  The screening tests you may need depend on your age, gender, family history, and lifestyle habits  For example, colorectal screening may be recommended if you are 48years old or older  Screening tests you need if you are a woman:   A Pap smear  is used to screen for cervical cancer  Pap smears are usually done every 3 to 5 years depending on your age  You may need them more often if you have had abnormal Pap smear test results in the past  Ask your healthcare provider how often you should have a Pap smear  A mammogram  is an x-ray of your breasts to screen for breast cancer  Experts recommend mammograms every 2 years starting at age 48 years  You may need a mammogram at age 52 years or younger if you have an increased risk for breast cancer  Talk to your healthcare provider about when you should start having mammograms and how often you need them  Vaccines you may need:   Get an influenza vaccine  every year  The influenza vaccine protects you from the flu  Several types of viruses cause the flu  The viruses change over time, so new vaccines are made each year  Get a tetanus-diphtheria (Td) booster vaccine  every 10 years  This vaccine protects you against tetanus and diphtheria  Tetanus is a severe infection that may cause painful muscle spasms and lockjaw  Diphtheria is a severe bacterial infection that causes a thick covering in the back of your mouth and throat  Get a human papillomavirus (HPV) vaccine  if you are female and aged 23 to 32 or male 23 to 24 and never received it  This vaccine protects you from HPV infection   HPV is the most common infection spread by sexual contact  HPV may also cause vaginal, penile, and anal cancers  Get a pneumococcal vaccine  if you are aged 72 years or older  The pneumococcal vaccine is an injection given to protect you from pneumococcal disease  Pneumococcal disease is an infection caused by pneumococcal bacteria  The infection may cause pneumonia, meningitis, or an ear infection  Get a shingles vaccine  if you are 60 or older, even if you have had shingles before  The shingles vaccine is an injection to protect you from the varicella-zoster virus  This is the same virus that causes chickenpox  Shingles is a painful rash that develops in people who had chickenpox or have been exposed to the virus  How to eat healthy:  My Plate is a model for planning healthy meals  It shows the types and amounts of foods that should go on your plate  Fruits and vegetables make up about half of your plate, and grains and protein make up the other half  A serving of dairy is included on the side of your plate  The amount of calories and serving sizes you need depends on your age, gender, weight, and height  Examples of healthy foods are listed below:  Eat a variety of vegetables  such as dark green, red, and orange vegetables  You can also include canned vegetables low in sodium (salt) and frozen vegetables without added butter or sauces  Eat a variety of fresh fruits , canned fruit in 100% juice, frozen fruit, and dried fruit  Include whole grains  At least half of the grains you eat should be whole grains  Examples include whole-wheat bread, wheat pasta, brown rice, and whole-grain cereals such as oatmeal     Eat a variety of protein foods such as seafood (fish and shellfish), lean meat, and poultry without skin (turkey and chicken)  Examples of lean meats include pork leg, shoulder, or tenderloin, and beef round, sirloin, tenderloin, and extra lean ground beef   Other protein foods include eggs and egg substitutes, beans, peas, soy products, nuts, and seeds  Choose low-fat dairy products such as skim or 1% milk or low-fat yogurt, cheese, and cottage cheese  Limit unhealthy fats  such as butter, hard margarine, and shortening  Exercise:  Exercise at least 30 minutes per day on most days of the week  Some examples of exercise include walking, biking, dancing, and swimming  You can also fit in more physical activity by taking the stairs instead of the elevator or parking farther away from stores  Include muscle strengthening activities 2 days each week  Regular exercise provides many health benefits  It helps you manage your weight, and decreases your risk for type 2 diabetes, heart disease, stroke, and high blood pressure  Exercise can also help improve your mood  Ask your healthcare provider about the best exercise plan for you  General health and safety guidelines:   Do not smoke  Nicotine and other chemicals in cigarettes and cigars can cause lung damage  Ask your healthcare provider for information if you currently smoke and need help to quit  E-cigarettes or smokeless tobacco still contain nicotine  Talk to your healthcare provider before you use these products  Limit alcohol  A drink of alcohol is 12 ounces of beer, 5 ounces of wine, or 1½ ounces of liquor  Lose weight, if needed  Being overweight increases your risk of certain health conditions  These include heart disease, high blood pressure, type 2 diabetes, and certain types of cancer  Protect your skin  Do not sunbathe or use tanning beds  Use sunscreen with a SPF 15 or higher  Apply sunscreen at least 15 minutes before you go outside  Reapply sunscreen every 2 hours  Wear protective clothing, hats, and sunglasses when you are outside  Drive safely  Always wear your seatbelt  Make sure everyone in your car wears a seatbelt  A seatbelt can save your life if you are in an accident  Do not use your cell phone when you are driving   This could distract you and cause an accident  Pull over if you need to make a call or send a text message  Practice safe sex  Use latex condoms if are sexually active and have more than one partner  Your healthcare provider may recommend screening tests for sexually transmitted infections (STIs)  Wear helmets, lifejackets, and protective gear  Always wear a helmet when you ride a bike or motorcycle, go skiing, or play sports that could cause a head injury  Wear protective equipment when you play sports  Wear a lifejacket when you are on a boat or doing water sports  © Copyright "[employer] 2022 Information is for End User's use only and may not be sold, redistributed or otherwise used for commercial purposes  All illustrations and images included in CareNotes® are the copyrighted property of [employer]  or Ruben Bolton  The above information is an  only  It is not intended as medical advice for individual conditions or treatments  Talk to your doctor, nurse or pharmacist before following any medical regimen to see if it is safe and effective for you

## 2022-12-22 NOTE — PROGRESS NOTES
Assessment/Plan:    1  Mild persistent asthma without complication    2  Post-COVID-19 syndrome    3  Other secondary hypertension  -     CBC and differential; Future  -     Comprehensive metabolic panel; Future  -     Lipid panel; Future  -     TSH, 3rd generation; Future    4  Annual physical exam        Patient Instructions     Consider taking the amlodipine 5mg twice a day instead of 10mg all at once  Wellness Visit for Adults   AMBULATORY CARE:   A wellness visit  is when you see your healthcare provider to get screened for health problems  Your healthcare provider will also give you advice on how to stay healthy  Write down your questions so you remember to ask them  Ask your healthcare provider how often you should have a wellness visit  What happens at a wellness visit:  Your healthcare provider will ask about your health, and your family history of health problems  This includes high blood pressure, heart disease, and cancer  He or she will ask if you have symptoms that concern you, if you smoke, and about your mood  You may also be asked about your intake of medicines, supplements, food, and alcohol  Any of the following may be done:  · Your weight  will be checked  Your height may also be checked so your body mass index (BMI) can be calculated  Your BMI shows if you are at a healthy weight  · Your blood pressure  and heart rate will be checked  Your temperature may also be checked  · Blood and urine tests  may be done  Blood tests may be done to check your cholesterol levels  Abnormal cholesterol levels increase your risk for heart disease and stroke  You may also need a blood or urine test to check for diabetes if you are at increased risk  Urine tests may be done to look for signs of an infection or kidney disease  · A physical exam  includes checking your heartbeat and lungs with a stethoscope  Your healthcare provider may also check your skin to look for sun damage      · Screening tests may be recommended  A screening test is done to check for diseases that may not cause symptoms  The screening tests you may need depend on your age, gender, family history, and lifestyle habits  For example, colorectal screening may be recommended if you are 48years old or older  Screening tests you need if you are a woman:   · A Pap smear  is used to screen for cervical cancer  Pap smears are usually done every 3 to 5 years depending on your age  You may need them more often if you have had abnormal Pap smear test results in the past  Ask your healthcare provider how often you should have a Pap smear  · A mammogram  is an x-ray of your breasts to screen for breast cancer  Experts recommend mammograms every 2 years starting at age 48 years  You may need a mammogram at age 52 years or younger if you have an increased risk for breast cancer  Talk to your healthcare provider about when you should start having mammograms and how often you need them  Vaccines you may need:   · Get an influenza vaccine  every year  The influenza vaccine protects you from the flu  Several types of viruses cause the flu  The viruses change over time, so new vaccines are made each year  · Get a tetanus-diphtheria (Td) booster vaccine  every 10 years  This vaccine protects you against tetanus and diphtheria  Tetanus is a severe infection that may cause painful muscle spasms and lockjaw  Diphtheria is a severe bacterial infection that causes a thick covering in the back of your mouth and throat  · Get a human papillomavirus (HPV) vaccine  if you are female and aged 23 to 32 or male 23 to 24 and never received it  This vaccine protects you from HPV infection  HPV is the most common infection spread by sexual contact  HPV may also cause vaginal, penile, and anal cancers  · Get a pneumococcal vaccine  if you are aged 72 years or older  The pneumococcal vaccine is an injection given to protect you from pneumococcal disease  Pneumococcal disease is an infection caused by pneumococcal bacteria  The infection may cause pneumonia, meningitis, or an ear infection  · Get a shingles vaccine  if you are 60 or older, even if you have had shingles before  The shingles vaccine is an injection to protect you from the varicella-zoster virus  This is the same virus that causes chickenpox  Shingles is a painful rash that develops in people who had chickenpox or have been exposed to the virus  How to eat healthy:  My Plate is a model for planning healthy meals  It shows the types and amounts of foods that should go on your plate  Fruits and vegetables make up about half of your plate, and grains and protein make up the other half  A serving of dairy is included on the side of your plate  The amount of calories and serving sizes you need depends on your age, gender, weight, and height  Examples of healthy foods are listed below:  · Eat a variety of vegetables  such as dark green, red, and orange vegetables  You can also include canned vegetables low in sodium (salt) and frozen vegetables without added butter or sauces  · Eat a variety of fresh fruits , canned fruit in 100% juice, frozen fruit, and dried fruit  · Include whole grains  At least half of the grains you eat should be whole grains  Examples include whole-wheat bread, wheat pasta, brown rice, and whole-grain cereals such as oatmeal     · Eat a variety of protein foods such as seafood (fish and shellfish), lean meat, and poultry without skin (turkey and chicken)  Examples of lean meats include pork leg, shoulder, or tenderloin, and beef round, sirloin, tenderloin, and extra lean ground beef  Other protein foods include eggs and egg substitutes, beans, peas, soy products, nuts, and seeds  · Choose low-fat dairy products such as skim or 1% milk or low-fat yogurt, cheese, and cottage cheese  · Limit unhealthy fats  such as butter, hard margarine, and shortening         Exercise: Exercise at least 30 minutes per day on most days of the week  Some examples of exercise include walking, biking, dancing, and swimming  You can also fit in more physical activity by taking the stairs instead of the elevator or parking farther away from stores  Include muscle strengthening activities 2 days each week  Regular exercise provides many health benefits  It helps you manage your weight, and decreases your risk for type 2 diabetes, heart disease, stroke, and high blood pressure  Exercise can also help improve your mood  Ask your healthcare provider about the best exercise plan for you  General health and safety guidelines:   · Do not smoke  Nicotine and other chemicals in cigarettes and cigars can cause lung damage  Ask your healthcare provider for information if you currently smoke and need help to quit  E-cigarettes or smokeless tobacco still contain nicotine  Talk to your healthcare provider before you use these products  · Limit alcohol  A drink of alcohol is 12 ounces of beer, 5 ounces of wine, or 1½ ounces of liquor  · Lose weight, if needed  Being overweight increases your risk of certain health conditions  These include heart disease, high blood pressure, type 2 diabetes, and certain types of cancer  · Protect your skin  Do not sunbathe or use tanning beds  Use sunscreen with a SPF 15 or higher  Apply sunscreen at least 15 minutes before you go outside  Reapply sunscreen every 2 hours  Wear protective clothing, hats, and sunglasses when you are outside  · Drive safely  Always wear your seatbelt  Make sure everyone in your car wears a seatbelt  A seatbelt can save your life if you are in an accident  Do not use your cell phone when you are driving  This could distract you and cause an accident  Pull over if you need to make a call or send a text message  · Practice safe sex  Use latex condoms if are sexually active and have more than one partner   Your healthcare provider may recommend screening tests for sexually transmitted infections (STIs)  · Wear helmets, lifejackets, and protective gear  Always wear a helmet when you ride a bike or motorcycle, go skiing, or play sports that could cause a head injury  Wear protective equipment when you play sports  Wear a lifejacket when you are on a boat or doing water sports  © Copyright [employer] 2022 Information is for End User's use only and may not be sold, redistributed or otherwise used for commercial purposes  All illustrations and images included in CareNotes® are the copyrighted property of A [employer] A M [employer]  or [employer]en [employer]   The above information is an  only  It is not intended as medical advice for individual conditions or treatments  Talk to your doctor, nurse or pharmacist before following any medical regimen to see if it is safe and effective for you  Wellness Visit for Adults   AMBULATORY CARE:   A wellness visit  is when you see your healthcare provider to get screened for health problems  Your healthcare provider will also give you advice on how to stay healthy  Write down your questions so you remember to ask them  Ask your healthcare provider how often you should have a wellness visit  What happens at a wellness visit:  Your healthcare provider will ask about your health, and your family history of health problems  This includes high blood pressure, heart disease, and cancer  He or she will ask if you have symptoms that concern you, if you smoke, and about your mood  You may also be asked about your intake of medicines, supplements, food, and alcohol  Any of the following may be done:  · Your weight  will be checked  Your height may also be checked so your body mass index (BMI) can be calculated  Your BMI shows if you are at a healthy weight  · Your blood pressure  and heart rate will be checked  Your temperature may also be checked  · Blood and urine tests  may be done   Blood tests may be done to check your cholesterol levels  Abnormal cholesterol levels increase your risk for heart disease and stroke  You may also need a blood or urine test to check for diabetes if you are at increased risk  Urine tests may be done to look for signs of an infection or kidney disease  · A physical exam  includes checking your heartbeat and lungs with a stethoscope  Your healthcare provider may also check your skin to look for sun damage  · Screening tests  may be recommended  A screening test is done to check for diseases that may not cause symptoms  The screening tests you may need depend on your age, gender, family history, and lifestyle habits  For example, colorectal screening may be recommended if you are 48years old or older  Screening tests you need if you are a woman:   · A Pap smear  is used to screen for cervical cancer  Pap smears are usually done every 3 to 5 years depending on your age  You may need them more often if you have had abnormal Pap smear test results in the past  Ask your healthcare provider how often you should have a Pap smear  · A mammogram  is an x-ray of your breasts to screen for breast cancer  Experts recommend mammograms every 2 years starting at age 48 years  You may need a mammogram at age 52 years or younger if you have an increased risk for breast cancer  Talk to your healthcare provider about when you should start having mammograms and how often you need them  Vaccines you may need:   · Get an influenza vaccine  every year  The influenza vaccine protects you from the flu  Several types of viruses cause the flu  The viruses change over time, so new vaccines are made each year  · Get a tetanus-diphtheria (Td) booster vaccine  every 10 years  This vaccine protects you against tetanus and diphtheria  Tetanus is a severe infection that may cause painful muscle spasms and lockjaw   Diphtheria is a severe bacterial infection that causes a thick covering in the back of your mouth and throat  · Get a human papillomavirus (HPV) vaccine  if you are female and aged 23 to 32 or male 23 to 24 and never received it  This vaccine protects you from HPV infection  HPV is the most common infection spread by sexual contact  HPV may also cause vaginal, penile, and anal cancers  · Get a pneumococcal vaccine  if you are aged 72 years or older  The pneumococcal vaccine is an injection given to protect you from pneumococcal disease  Pneumococcal disease is an infection caused by pneumococcal bacteria  The infection may cause pneumonia, meningitis, or an ear infection  · Get a shingles vaccine  if you are 60 or older, even if you have had shingles before  The shingles vaccine is an injection to protect you from the varicella-zoster virus  This is the same virus that causes chickenpox  Shingles is a painful rash that develops in people who had chickenpox or have been exposed to the virus  How to eat healthy:  My Plate is a model for planning healthy meals  It shows the types and amounts of foods that should go on your plate  Fruits and vegetables make up about half of your plate, and grains and protein make up the other half  A serving of dairy is included on the side of your plate  The amount of calories and serving sizes you need depends on your age, gender, weight, and height  Examples of healthy foods are listed below:  · Eat a variety of vegetables  such as dark green, red, and orange vegetables  You can also include canned vegetables low in sodium (salt) and frozen vegetables without added butter or sauces  · Eat a variety of fresh fruits , canned fruit in 100% juice, frozen fruit, and dried fruit  · Include whole grains  At least half of the grains you eat should be whole grains   Examples include whole-wheat bread, wheat pasta, brown rice, and whole-grain cereals such as oatmeal     · Eat a variety of protein foods such as seafood (fish and shellfish), lean meat, and poultry without skin (turkey and chicken)  Examples of lean meats include pork leg, shoulder, or tenderloin, and beef round, sirloin, tenderloin, and extra lean ground beef  Other protein foods include eggs and egg substitutes, beans, peas, soy products, nuts, and seeds  · Choose low-fat dairy products such as skim or 1% milk or low-fat yogurt, cheese, and cottage cheese  · Limit unhealthy fats  such as butter, hard margarine, and shortening  Exercise:  Exercise at least 30 minutes per day on most days of the week  Some examples of exercise include walking, biking, dancing, and swimming  You can also fit in more physical activity by taking the stairs instead of the elevator or parking farther away from stores  Include muscle strengthening activities 2 days each week  Regular exercise provides many health benefits  It helps you manage your weight, and decreases your risk for type 2 diabetes, heart disease, stroke, and high blood pressure  Exercise can also help improve your mood  Ask your healthcare provider about the best exercise plan for you  General health and safety guidelines:   · Do not smoke  Nicotine and other chemicals in cigarettes and cigars can cause lung damage  Ask your healthcare provider for information if you currently smoke and need help to quit  E-cigarettes or smokeless tobacco still contain nicotine  Talk to your healthcare provider before you use these products  · Limit alcohol  A drink of alcohol is 12 ounces of beer, 5 ounces of wine, or 1½ ounces of liquor  · Lose weight, if needed  Being overweight increases your risk of certain health conditions  These include heart disease, high blood pressure, type 2 diabetes, and certain types of cancer  · Protect your skin  Do not sunbathe or use tanning beds  Use sunscreen with a SPF 15 or higher  Apply sunscreen at least 15 minutes before you go outside  Reapply sunscreen every 2 hours   Wear protective clothing, hats, and sunglasses when you are outside  · Drive safely  Always wear your seatbelt  Make sure everyone in your car wears a seatbelt  A seatbelt can save your life if you are in an accident  Do not use your cell phone when you are driving  This could distract you and cause an accident  Pull over if you need to make a call or send a text message  · Practice safe sex  Use latex condoms if are sexually active and have more than one partner  Your healthcare provider may recommend screening tests for sexually transmitted infections (STIs)  · Wear helmets, lifejackets, and protective gear  Always wear a helmet when you ride a bike or motorcycle, go skiing, or play sports that could cause a head injury  Wear protective equipment when you play sports  Wear a lifejacket when you are on a boat or doing water sports  © Copyright [employer] 2022 Information is for End User's use only and may not be sold, redistributed or otherwise used for commercial purposes  All illustrations and images included in CareNotes® are the copyrighted property of A [employer] A M , Inc  or [address] [place] [address]  The above information is an  only  It is not intended as medical advice for individual conditions or treatments  Talk to your doctor, nurse or pharmacist before following any medical regimen to see if it is safe and effective for you  Return in about 3 months (around 3/22/2023)  Subjective:      Patient ID: Tae Galan is a 47 y o  female  Chief Complaint   Patient presents with   • Annual Exam       Here for BP f/u  BP running 120-140's/70-80's  norvasc does make her tired so she takes it at night  Notes urinary frequency though she is drinking more water  Ankles are always swollen, not more so with the norvasc  Has foot problem at baseline  Had COVID a few weeks ago, hadn't needed the inhalers for several years prior, has been following with pulmonary post COVID  CT scan, partial PFTs   Has seen allergist  Doing well on qvar, not needing the albuterol too much  Unsure if all of this has been contributing to her BP  BP post COVID reaction? nasacort works better than astelin, has more congestion than runny nose  The following portions of the patient's history were reviewed and updated as appropriate: allergies, current medications, past family history, past medical history, past social history, past surgical history and problem list     Review of Systems   Constitutional: Negative for fatigue and fever  HENT: Positive for congestion and rhinorrhea  Eyes: Negative for visual disturbance  Respiratory: Positive for cough, shortness of breath and wheezing  Negative for chest tightness  Cardiovascular: Positive for leg swelling  Negative for chest pain and palpitations  Gastrointestinal: Negative for abdominal pain  Genitourinary: Negative for difficulty urinating  Musculoskeletal: Negative for arthralgias  Neurological: Negative for headaches  Hematological: Does not bruise/bleed easily  Current Outpatient Medications   Medication Sig Dispense Refill   • albuterol (ProAir HFA) 90 mcg/act inhaler Inhale 2 puffs every 4 (four) hours as needed for wheezing 18 g 1   • amLODIPine (NORVASC) 10 mg tablet Take 1 tablet (10 mg total) by mouth daily 90 tablet 0   • b complex vitamins capsule Take 1 capsule by mouth daily     • beclomethasone (Qvar RediHaler) 40 MCG/ACT inhaler      • Blood Pressure KIT Use in the morning 1 kit 0   • Levocetirizine Dihydrochloride (XYZAL PO) Take by mouth     • Multiple Vitamin-Folic Acid TABS Take by mouth     • Respiratory Therapy Supplies (Spirometer) KIT Use every 4 (four) hours 1 kit 0   • Triamcinolone Acetonide (NASACORT AQ NA) into each nostril       No current facility-administered medications for this visit         Objective:    /86 (BP Location: Right arm, Patient Position: Sitting, Cuff Size: Standard)   Pulse 88   Temp 97 8 °F (36 6 °C)  5' 3 75" (1 619 m)   Wt 114 kg (251 lb 6 4 oz)   SpO2 98%   BMI 43 49 kg/m²        Physical Exam  Vitals reviewed  Constitutional:       Appearance: Normal appearance  She is well-developed  Cardiovascular:      Rate and Rhythm: Normal rate and regular rhythm  Heart sounds: Normal heart sounds  Pulmonary:      Effort: Pulmonary effort is normal       Breath sounds: Normal breath sounds  Skin:     General: Skin is warm  Neurological:      Mental Status: She is alert and oriented to person, place, and time  Psychiatric:         Mood and Affect: Mood normal          Behavior: Behavior normal          Thought Content:  Thought content normal          Judgment: Judgment normal                 Mis Toth MD

## 2022-12-22 NOTE — PROGRESS NOTES
[address] [place] [address]    NAME: Tae Galan  AGE: 47 y o  SEX: female  : 1970     DATE: 2022     Assessment and Plan:     Problem List Items Addressed This Visit        Respiratory    Mild persistent asthma without complication - Primary   Other Visit Diagnoses     Post-COVID-19 syndrome        Other secondary hypertension        Relevant Orders    CBC and differential    Comprehensive metabolic panel    Lipid panel    TSH, 3rd generation    Annual physical exam              Immunizations and preventive care screenings were discussed with patient today  Appropriate education was printed on patient's after visit summary  Counseling:  · Exercise: the importance of regular exercise/physical activity was discussed  Recommend exercise 3-5 times per week for at least 30 minutes  No follow-ups on file  Chief Complaint:     Chief Complaint   Patient presents with   • Annual Exam      History of Present Illness:     Adult Annual Physical   Patient here for a comprehensive physical exam  The patient reports problems - see today's note  Diet and Physical Activity  · Diet/Nutrition: well balanced diet and consuming 3-5 servings of fruits/vegetables daily  · Exercise: no formal exercise  somewhat restricted now due to post COVID     Depression Screening  PHQ-2/9 Depression Screening         General Health  · Sleep: sleeps well  · Hearing: decreased - bilateral and requires use of hearing aids  · Vision: goes for regular eye exams and wears glasses  · Dental: regular dental visits  /GYN Health  · Patient is: premenopausal  · Last menstrual period: 22  · Contraceptive method: barrier methods  · adeq calcium  · Denies DV     Review of Systems:     Review of Systems   Constitutional: Negative for fatigue and fever  HENT: Positive for congestion and postnasal drip      Eyes: Negative for visual disturbance  Respiratory: Positive for cough, shortness of breath and wheezing  Negative for chest tightness  Cardiovascular: Negative for chest pain and palpitations  Gastrointestinal: Negative for abdominal pain  Genitourinary: Negative for difficulty urinating  Musculoskeletal: Negative for arthralgias  Neurological: Negative for headaches  Hematological: Does not bruise/bleed easily  Past Medical History:     Past Medical History:   Diagnosis Date   • Asthma Birth   • Pulmonary disease       Past Surgical History:     Past Surgical History:   Procedure Laterality Date   • BLADDER SURGERY     • HAND RECONSTRUCTION Bilateral 5/2019-11/2019      Social History:     Social History     Socioeconomic History   • Marital status: /Civil Union     Spouse name: None   • Number of children: None   • Years of education: None   • Highest education level: None   Occupational History   • None   Tobacco Use   • Smoking status: Never   • Smokeless tobacco: Never   Vaping Use   • Vaping Use: Never used   Substance and Sexual Activity   • Alcohol use:  Yes     Alcohol/week: 1 0 standard drink     Types: 1 Glasses of wine per week     Comment: social   • Drug use: No   • Sexual activity: Yes     Partners: Male     Birth control/protection: Condom Male   Other Topics Concern   • None   Social History Narrative   • None     Social Determinants of Health     Financial Resource Strain: Not on file   Food Insecurity: Not on file   Transportation Needs: Not on file   Physical Activity: Not on file   Stress: Not on file   Social Connections: Not on file   Intimate Partner Violence: Not on file   Housing Stability: Not on file      Family History:     Family History   Problem Relation Age of Onset   • Diabetes Family    • No Known Problems Mother    • No Known Problems Father    • No Known Problems Daughter    • No Known Problems Maternal Grandmother    • No Known Problems Maternal Grandfather    • Breast cancer Paternal Grandmother [de-identified]   • No Known Problems Paternal Grandfather    • No Known Problems Son    • No Known Problems Paternal Aunt       Current Medications:     Current Outpatient Medications   Medication Sig Dispense Refill   • albuterol (ProAir HFA) 90 mcg/act inhaler Inhale 2 puffs every 4 (four) hours as needed for wheezing 18 g 1   • amLODIPine (NORVASC) 10 mg tablet Take 1 tablet (10 mg total) by mouth daily 90 tablet 0   • b complex vitamins capsule Take 1 capsule by mouth daily     • beclomethasone (Qvar RediHaler) 40 MCG/ACT inhaler      • Blood Pressure KIT Use in the morning 1 kit 0   • Levocetirizine Dihydrochloride (XYZAL PO) Take by mouth     • Multiple Vitamin-Folic Acid TABS Take by mouth     • Respiratory Therapy Supplies (Spirometer) KIT Use every 4 (four) hours 1 kit 0   • Triamcinolone Acetonide (NASACORT AQ NA) into each nostril       No current facility-administered medications for this visit  Allergies: Allergies   Allergen Reactions   • Erythromycin    • Penicillins       Physical Exam:     /86 (BP Location: Right arm, Patient Position: Sitting, Cuff Size: Standard)   Pulse 88   Temp 97 8 °F (36 6 °C)   Ht 5' 3 75" (1 619 m)   Wt 114 kg (251 lb 6 4 oz)   SpO2 98%   BMI 43 49 kg/m²     Physical Exam  Vitals reviewed  Constitutional:       Appearance: Normal appearance  She is well-developed  HENT:      Right Ear: Tympanic membrane, ear canal and external ear normal       Left Ear: Tympanic membrane, ear canal and external ear normal       Mouth/Throat:      Mouth: Mucous membranes are moist       Pharynx: Oropharynx is clear  Eyes:      Conjunctiva/sclera: Conjunctivae normal       Pupils: Pupils are equal, round, and reactive to light  Cardiovascular:      Rate and Rhythm: Normal rate and regular rhythm  Heart sounds: Normal heart sounds  Pulmonary:      Effort: Pulmonary effort is normal       Breath sounds: Normal breath sounds     Abdominal: General: Bowel sounds are normal       Palpations: Abdomen is soft  Musculoskeletal:         General: Normal range of motion  Cervical back: Normal range of motion and neck supple  Right lower leg: No edema  Left lower leg: No edema  Lymphadenopathy:      Cervical: No cervical adenopathy  Skin:     General: Skin is warm  Capillary Refill: Capillary refill takes less than 2 seconds  Neurological:      General: No focal deficit present  Mental Status: She is alert and oriented to person, place, and time  Psychiatric:         Mood and Affect: Mood normal          Behavior: Behavior normal          Thought Content:  Thought content normal          Judgment: Judgment normal           Elmo Conti MD  [facility]

## 2023-01-26 DIAGNOSIS — R03.0 ELEVATED BLOOD PRESSURE READING: ICD-10-CM

## 2023-01-27 ENCOUNTER — TELEPHONE (OUTPATIENT)
Dept: FAMILY MEDICINE CLINIC | Facility: CLINIC | Age: 53
End: 2023-01-27

## 2023-01-27 RX ORDER — AMLODIPINE BESYLATE 10 MG/1
10 TABLET ORAL DAILY
Qty: 90 TABLET | Refills: 0 | Status: SHIPPED | OUTPATIENT
Start: 2023-01-27

## 2023-01-27 NOTE — TELEPHONE ENCOUNTER
Patient called stating that you discussed side effects of Amlodipine with her and asked if she had any swelling in her ankles  Patient said she did not at the time but in the last three days she has now been experiencing the swelling in her ankles  Patient is asking what you recommend she do    Patient also informed that if swelling gets worse or other symptoms occur to go to ED or Urgent care to be checked

## 2023-01-30 NOTE — TELEPHONE ENCOUNTER
Patient states the swelling does go down when she puts her feet up and night and is down by morning then obviously on her feet all day it goes back up  Patient does not have any other symptoms  BP running 130-140/73-86    Will keep eye on swelling and if feels will decrease dose to 5mg and keep us updated with BP readings

## 2023-03-08 ENCOUNTER — RA CDI HCC (OUTPATIENT)
Dept: OTHER | Facility: HOSPITAL | Age: 53
End: 2023-03-08

## 2023-03-08 NOTE — PROGRESS NOTES
[facility]  coding opportunities          Chart Reviewed number of suggestions sent to Provider: 2     Patients Insurance        Commercial Insurance: 47 [facility]       J45 30:  Mild persistent asthma without complication [386508]    Found in active problem list - please review and assess and document per MEAT if applicable for 5311    Q80 72

## 2023-03-23 ENCOUNTER — OFFICE VISIT (OUTPATIENT)
Dept: FAMILY MEDICINE CLINIC | Facility: CLINIC | Age: 53
End: 2023-03-23

## 2023-03-23 VITALS
HEIGHT: 64 IN | BODY MASS INDEX: 42.72 KG/M2 | DIASTOLIC BLOOD PRESSURE: 84 MMHG | OXYGEN SATURATION: 98 % | WEIGHT: 250.2 LBS | HEART RATE: 86 BPM | SYSTOLIC BLOOD PRESSURE: 136 MMHG | TEMPERATURE: 97.7 F

## 2023-03-23 DIAGNOSIS — I10 PRIMARY HYPERTENSION: ICD-10-CM

## 2023-03-23 DIAGNOSIS — M75.82 TENDINITIS OF LEFT ROTATOR CUFF: Primary | ICD-10-CM

## 2023-03-23 DIAGNOSIS — R03.0 ELEVATED BLOOD PRESSURE READING: ICD-10-CM

## 2023-03-23 DIAGNOSIS — E66.01 CLASS 3 SEVERE OBESITY DUE TO EXCESS CALORIES WITH SERIOUS COMORBIDITY AND BODY MASS INDEX (BMI) OF 40.0 TO 44.9 IN ADULT (HCC): ICD-10-CM

## 2023-03-23 RX ORDER — AMLODIPINE BESYLATE 10 MG/1
10 TABLET ORAL DAILY
Qty: 90 TABLET | Refills: 3 | Status: SHIPPED | OUTPATIENT
Start: 2023-03-23

## 2023-03-23 NOTE — PROGRESS NOTES
Assessment/Plan:    1  Tendinitis of left rotator cuff  Comments:  home exercises given    2  Primary hypertension    3  Class 3 severe obesity due to excess calories with serious comorbidity and body mass index (BMI) of 40 0 to 44 9 in adult (HCC)    4  Elevated blood pressure reading  -     amLODIPine (NORVASC) 10 mg tablet; Take 1 tablet (10 mg total) by mouth daily        There are no Patient Instructions on file for this visit  Return in about 6 months (around 9/23/2023)  Subjective:      Patient ID: Dolly Irby is a 47 y o  female  Chief Complaint   Patient presents with   • Follow-up       Here for BP f/u  Running 120-140/70-80's  Pt has been on amlodipine 10mg  Her cuff does not match our cuff today in office, still reading higher  Had no symptoms previously and no change now  Not using her inhalers as much  COVID back in September, reactivated her need for inhaler, SOB has been back to normal for the last 2 months  C/o pain in L shoulder ever since driving back from [address] [employer] [employer] in November, pain with lying on it  Trouble with reaching out or reaching across chest   pain with lifting  No N/T in the LUE  Has reduced her calories and sugar intake  The following portions of the patient's history were reviewed and updated as appropriate: allergies, current medications, past family history, past medical history, past social history, past surgical history and problem list     Review of Systems   Constitutional: Negative for fatigue and fever  HENT: Negative for congestion  Eyes: Negative for visual disturbance  Respiratory: Negative for chest tightness and shortness of breath  Cardiovascular: Positive for leg swelling  Negative for chest pain and palpitations (ankles occ)  Gastrointestinal: Negative for abdominal pain  Genitourinary: Negative for difficulty urinating  Musculoskeletal: Positive for arthralgias  Neurological: Negative for headaches     Hematological: Does not bruise/bleed easily  Current Outpatient Medications   Medication Sig Dispense Refill   • albuterol (ProAir HFA) 90 mcg/act inhaler Inhale 2 puffs every 4 (four) hours as needed for wheezing 18 g 1   • amLODIPine (NORVASC) 10 mg tablet Take 1 tablet (10 mg total) by mouth daily 90 tablet 3   • b complex vitamins capsule Take 1 capsule by mouth daily     • beclomethasone (Qvar RediHaler) 40 MCG/ACT inhaler      • Blood Pressure KIT Use in the morning 1 kit 0   • Levocetirizine Dihydrochloride (XYZAL PO) Take by mouth     • Multiple Vitamin-Folic Acid TABS Take by mouth     • Respiratory Therapy Supplies (Spirometer) KIT Use every 4 (four) hours 1 kit 0   • Triamcinolone Acetonide (NASACORT AQ NA) into each nostril       No current facility-administered medications for this visit  Objective:    /84 (BP Location: Right arm, Patient Position: Sitting, Cuff Size: Large)   Pulse 86   Temp 97 7 °F (36 5 °C)   Ht 5' 3 75" (1 619 m)   Wt 113 kg (250 lb 3 2 oz)   SpO2 98%   BMI 43 28 kg/m²        Physical Exam  Vitals reviewed  Constitutional:       Appearance: Normal appearance  Comments: 168/81   Cardiovascular:      Rate and Rhythm: Normal rate and regular rhythm  Heart sounds: Normal heart sounds  Pulmonary:      Effort: Pulmonary effort is normal       Breath sounds: Normal breath sounds  Musculoskeletal:         General: Tenderness (with ROM about the L shoulder, cannot abduct beyond 90 degrees, reaching backward, extension) present  Comments: Strength intact   Neurological:      Mental Status: She is alert  Psychiatric:         Mood and Affect: Mood normal          Behavior: Behavior normal          Thought Content:  Thought content normal          Judgment: Judgment normal                 Kofi Schmidt MD

## 2023-04-03 ENCOUNTER — HOSPITAL ENCOUNTER (OUTPATIENT)
Dept: RADIOLOGY | Age: 53
Discharge: HOME | End: 2023-04-03

## 2023-04-03 VITALS — BODY MASS INDEX: 42.68 KG/M2 | HEIGHT: 64 IN | WEIGHT: 250 LBS

## 2023-04-03 DIAGNOSIS — Z12.31 ENCOUNTER FOR SCREENING MAMMOGRAM FOR MALIGNANT NEOPLASM OF BREAST: ICD-10-CM

## 2023-06-07 ENCOUNTER — OFFICE VISIT (OUTPATIENT)
Dept: FAMILY MEDICINE CLINIC | Facility: CLINIC | Age: 53
End: 2023-06-07
Payer: COMMERCIAL

## 2023-06-07 VITALS
TEMPERATURE: 97.6 F | SYSTOLIC BLOOD PRESSURE: 132 MMHG | OXYGEN SATURATION: 97 % | HEIGHT: 64 IN | BODY MASS INDEX: 42.51 KG/M2 | WEIGHT: 249 LBS | DIASTOLIC BLOOD PRESSURE: 86 MMHG | HEART RATE: 88 BPM

## 2023-06-07 DIAGNOSIS — G89.29 CHRONIC LEFT SHOULDER PAIN: Primary | ICD-10-CM

## 2023-06-07 DIAGNOSIS — M25.512 CHRONIC LEFT SHOULDER PAIN: Primary | ICD-10-CM

## 2023-06-07 PROCEDURE — 99214 OFFICE O/P EST MOD 30 MIN: CPT | Performed by: FAMILY MEDICINE

## 2023-06-07 RX ORDER — PREDNISONE 20 MG/1
40 TABLET ORAL DAILY
Qty: 10 TABLET | Refills: 0 | Status: SHIPPED | OUTPATIENT
Start: 2023-06-07 | End: 2023-06-12

## 2023-06-07 NOTE — PROGRESS NOTES
Name: Geena Yates      : 1970      MRN: [id number]  Encounter Provider: India Curling, MD  Encounter Date: 2023   Encounter department: [address] [place] [address]  Chronic left shoulder pain  Assessment & Plan:  Acute on chronic left-sided shoulder pain, likely secondary to tendinitis  There may be rotator cuff involvement based on physical exam findings  We will treat conservatively at this time, over-the-counter anti-inflammatory medication, Tylenol extra strength 2 tablets up to every 8-12 hours a day  Ice affected area especially after work, over [place] up to 15 minutes at a time  Gentle stretching exercises involving the left shoulder  If there is no significant improvement in symptoms, start short course of oral steroid, prednisone 40 mg for total 5 days  If you do not see any significant proving symptom in 4 to 6 weeks please return to the office for evaluation  We will obtain x-ray of the left shoulder for evaluation of arthritis and start physical therapy    Orders:  -     predniSONE 20 mg tablet; Take 2 tablets (40 mg total) by mouth daily for 5 days         Subjective     HPI     73-year-old female presents for eval of acute on chronic left sided shoulder pain  Left-sided shoulder pain  Patient reports symptom has been present since February of this year, persistent slightly worsening compared to initial presentation  Patient denies any history of similar symptoms in the past, no history of shoulder injury or surgery  Patient is a teacher, 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  She is right-handed  Patient reports the pain is localized to the anterior aspect of the shoulder, certain movements such as abduction, elevation causes pain  She has tried over-the-counter Advil however is reluctant to take the medication due to fear of elevating her blood pressure    Denies any significant numbness and tingling or increased clumsiness involving the left hand  Review of Systems   Constitutional: Negative for chills and fever  HENT: Negative for congestion, rhinorrhea and sore throat  Respiratory: Negative for shortness of breath  Cardiovascular: Negative for chest pain  Gastrointestinal: Negative for abdominal pain  Musculoskeletal: Positive for arthralgias  Sided shoulder pain   Neurological: Negative for headaches  Psychiatric/Behavioral: Negative for sleep disturbance  Past Medical History:   Diagnosis Date   • Asthma Birth   • Pulmonary disease      Past Surgical History:   Procedure Laterality Date   • BLADDER SURGERY     • HAND RECONSTRUCTION Bilateral 5/2019-11/2019     Family History   Problem Relation Age of Onset   • No Known Problems Mother    • No Known Problems Father    • No Known Problems Daughter    • No Known Problems Maternal Grandmother    • No Known Problems Maternal Grandfather    • Breast cancer Paternal Grandmother [de-identified]   • No Known Problems Paternal Grandfather    • No Known Problems Son    • No Known Problems Paternal Aunt    • Diabetes Family    • Lung cancer Maternal Uncle      Social History     Socioeconomic History   • Marital status: /Civil Union     Spouse name: None   • Number of children: None   • Years of education: None   • Highest education level: None   Occupational History   • None   Tobacco Use   • Smoking status: Never   • Smokeless tobacco: Never   Vaping Use   • Vaping Use: Never used   Substance and Sexual Activity   • Alcohol use:  Yes     Alcohol/week: 1 0 standard drink of alcohol     Types: 1 Glasses of wine per week     Comment: social   • Drug use: No   • Sexual activity: Yes     Partners: Male     Birth control/protection: Condom Male   Other Topics Concern   • None   Social History Narrative   • None     Social Determinants of Health     Financial Resource Strain: Not on file   Food Insecurity: Not on file   Transportation Needs: Not on file   Physical Activity: Not on file "  Stress: Not on file   Social Connections: Not on file   Intimate Partner Violence: Not on file   Housing Stability: Not on file     Current Outpatient Medications on File Prior to Visit   Medication Sig   • amLODIPine (NORVASC) 10 mg tablet Take 1 tablet (10 mg total) by mouth daily   • b complex vitamins capsule Take 1 capsule by mouth daily   • beclomethasone (Qvar RediHaler) 40 MCG/ACT inhaler    • Blood Pressure KIT Use in the morning   • Levocetirizine Dihydrochloride (XYZAL PO) Take by mouth   • Multiple Vitamin-Folic Acid TABS Take by mouth   • Triamcinolone Acetonide (NASACORT AQ NA) into each nostril   • albuterol (ProAir HFA) 90 mcg/act inhaler Inhale 2 puffs every 4 (four) hours as needed for wheezing (Patient not taking: Reported on 6/7/2023)   • Respiratory Therapy Supplies (Spirometer) KIT Use every 4 (four) hours     Allergies   Allergen Reactions   • Erythromycin GI Intolerance   • Penicillins GI Intolerance     Stomach upset, needs to eat meal before taking med      Immunization History   Administered Date(s) Administered   • COVID-19 MODERNA VACC 0 5 ML IM 01/23/2021, 02/19/2021   • H1N1, All Formulations 11/22/2009       Objective     /86 (BP Location: Right arm, Patient Position: Sitting, Cuff Size: Standard)   Pulse 88   Temp 97 6 °F (36 4 °C)   Ht 5' 3 75\" (1 619 m)   Wt 113 kg (249 lb)   SpO2 97%   BMI 43 08 kg/m²     Physical Exam  Vitals reviewed  Constitutional:       General: She is not in acute distress  Appearance: Normal appearance  She is obese  She is not ill-appearing, toxic-appearing or diaphoretic  Cardiovascular:      Rate and Rhythm: Normal rate  Pulses: Normal pulses  Pulmonary:      Effort: Pulmonary effort is normal  No respiratory distress  Abdominal:      General: Abdomen is flat  Musculoskeletal:         General: Tenderness present  No swelling, deformity or signs of injury        Comments: Tenderness over the anterior shoulder  Patient has " diffuse pain elicited with certain movements  External rotation causes pain, shoulder abduction above shoulder height causes pain  Patient does have positive Neer sign, Campbell sign  Has positive empty can test and belly press test on the left   Skin:     General: Skin is warm and dry  Capillary Refill: Capillary refill takes less than 2 seconds  Coloration: Skin is not jaundiced  Neurological:      General: No focal deficit present  Mental Status: She is alert     Psychiatric:         Mood and Affect: Mood normal            Scott Messer MD

## 2023-08-20 ENCOUNTER — OFFICE VISIT (OUTPATIENT)
Dept: URGENT CARE | Facility: MEDICAL CENTER | Age: 53
End: 2023-08-20
Payer: COMMERCIAL

## 2023-08-20 VITALS
OXYGEN SATURATION: 100 % | DIASTOLIC BLOOD PRESSURE: 92 MMHG | TEMPERATURE: 98.9 F | WEIGHT: 249 LBS | RESPIRATION RATE: 22 BRPM | HEIGHT: 64 IN | SYSTOLIC BLOOD PRESSURE: 162 MMHG | BODY MASS INDEX: 42.51 KG/M2 | HEART RATE: 92 BPM

## 2023-08-20 DIAGNOSIS — T63.481A INSECT STINGS, ACCIDENTAL OR UNINTENTIONAL, INITIAL ENCOUNTER: Primary | ICD-10-CM

## 2023-08-20 PROCEDURE — 99213 OFFICE O/P EST LOW 20 MIN: CPT | Performed by: PHYSICIAN ASSISTANT

## 2023-08-20 RX ORDER — PREDNISONE 20 MG/1
20 TABLET ORAL 2 TIMES DAILY WITH MEALS
Qty: 10 TABLET | Refills: 0 | Status: SHIPPED | OUTPATIENT
Start: 2023-08-20 | End: 2023-08-20 | Stop reason: CLARIF

## 2023-08-20 RX ORDER — PREDNISONE 20 MG/1
20 TABLET ORAL 2 TIMES DAILY WITH MEALS
Qty: 10 TABLET | Refills: 0 | Status: SHIPPED | OUTPATIENT
Start: 2023-08-20 | End: 2023-08-25

## 2023-08-20 NOTE — PROGRESS NOTES
[facility]        NAME: Juliaan Elizabeth is a 47 y.o. female  : 1970    MRN: [id number]  DATE: 2023  TIME: 2:20 PM    Assessment and Plan   Insect stings, accidental or unintentional, initial encounter [T63.481A]  1. Insect stings, accidental or unintentional, initial encounter  predniSONE 20 mg tablet    DISCONTINUED: predniSONE 20 mg tablet            Patient Instructions     1. Take Prednisone 20mg. Twice daily x 3-5 days  2. Cool compresses as needed for comfort  3. Zyrtec or Xyzal as needed for itching  4. Follow up with PCP in 3-5 days if symptoms persist.  5. Proceed to  ER if symptoms worsen. Chief Complaint     Chief Complaint   Patient presents with   • Bee Sting     Bee sting to left abdomen, left fourth digit; carries an epi pen "just in case" but no known anaphylaxis for bee stings; patient denies shortness of breath or chest pain    Complaints of left hand swelling and bilateral ear "warmth"          History of Present Illness       Arabella Mcqueen a 67-year-old female who presents with a bee sting to her left fourth finger and abdomen that happened earlier today. Denies any difficulty breathing, shortness of breath, difficulty swallowing or swelling of her lips, tongue or mucous membranes. She does have a history of localized reactions from bee stings and has an EpiPen but has not used it. Review of Systems   Review of Systems   Constitutional: Negative. HENT: Negative. Respiratory: Negative. Gastrointestinal: Negative. Skin: Positive for color change, rash and wound.          Current Medications       Current Outpatient Medications:   •  predniSONE 20 mg tablet, Take 1 tablet (20 mg total) by mouth 2 (two) times a day with meals for 5 days, Disp: 10 tablet, Rfl: 0  •  albuterol (ProAir HFA) 90 mcg/act inhaler, Inhale 2 puffs every 4 (four) hours as needed for wheezing (Patient not taking: Reported on 2023), Disp: 18 g, Rfl: 1  •  amLODIPine (NORVASC) 10 mg tablet, Take 1 tablet (10 mg total) by mouth daily, Disp: 90 tablet, Rfl: 3  •  b complex vitamins capsule, Take 1 capsule by mouth daily, Disp: , Rfl:   •  beclomethasone (Qvar RediHaler) 40 MCG/ACT inhaler, , Disp: , Rfl:   •  Blood Pressure KIT, Use in the morning, Disp: 1 kit, Rfl: 0  •  Levocetirizine Dihydrochloride (XYZAL PO), Take by mouth, Disp: , Rfl:   •  Multiple Vitamin-Folic Acid TABS, Take by mouth, Disp: , Rfl:   •  Respiratory Therapy Supplies (Spirometer) KIT, Use every 4 (four) hours, Disp: 1 kit, Rfl: 0  •  Triamcinolone Acetonide (NASACORT AQ NA), into each nostril, Disp: , Rfl:     Current Allergies     Allergies as of 08/20/2023 - Reviewed 08/20/2023   Allergen Reaction Noted   • Erythromycin GI Intolerance 03/17/2014   • Penicillins GI Intolerance 03/17/2014            The following portions of the patient's history were reviewed and updated as appropriate: allergies, current medications, past family history, past medical history, past social history, past surgical history and problem list.     Past Medical History:   Diagnosis Date   • Asthma Birth   • Pulmonary disease        Past Surgical History:   Procedure Laterality Date   • BLADDER SURGERY     • HAND RECONSTRUCTION Bilateral 5/2019-11/2019       Family History   Problem Relation Age of Onset   • No Known Problems Mother    • No Known Problems Father    • No Known Problems Daughter    • No Known Problems Maternal Grandmother    • No Known Problems Maternal Grandfather    • Breast cancer Paternal Grandmother 80   • No Known Problems Paternal Grandfather    • No Known Problems Son    • No Known Problems Paternal Aunt    • Diabetes Family    • Lung cancer Maternal Uncle          Medications have been verified.         Objective   /92   Pulse 92   Temp 98.9 °F (37.2 °C) (Temporal)   Resp 22   Ht 5' 3.74" (1.619 m)   Wt 113 kg (249 lb)   LMP 03/07/2023 (Approximate)   SpO2 100%   BMI 43.09 kg/m²   Patient's last menstrual period was 03/07/2023 (approximate). Physical Exam     Physical Exam  Constitutional:       General: She is not in acute distress. Appearance: Normal appearance. She is not ill-appearing. HENT:      Head: Normocephalic and atraumatic. Right Ear: Tympanic membrane and ear canal normal.      Left Ear: Tympanic membrane and ear canal normal.      Nose: Nose normal.      Mouth/Throat:      Lips: Pink. Pharynx: Oropharynx is clear. No pharyngeal swelling or posterior oropharyngeal erythema. Cardiovascular:      Rate and Rhythm: Normal rate and regular rhythm. Heart sounds: Normal heart sounds, S1 normal and S2 normal. No murmur heard. Pulmonary:      Effort: Pulmonary effort is normal.      Breath sounds: Normal breath sounds and air entry. Skin:     Comments: There is some mild swelling over the left fourth finger as well as a 2 to 3 cm erythematous patch on the left lower abdomen. No visible hives or swelling of the lips, tongue or mucous membranes. Neurological:      Mental Status: She is alert.

## 2023-08-20 NOTE — PATIENT INSTRUCTIONS
1. Take Prednisone 20mg. Twice daily x 3-5 days  2. Cool compresses as needed for comfort  3. Zyrtec or Xyzal as needed for itching  4. Follow up with PCP in 3-5 days if symptoms persist.  5. Proceed to  ER if symptoms worsen.

## 2023-08-21 ENCOUNTER — TELEPHONE (OUTPATIENT)
Dept: FAMILY MEDICINE CLINIC | Facility: CLINIC | Age: 53
End: 2023-08-21

## 2023-08-21 NOTE — TELEPHONE ENCOUNTER
Patient got bit by a bee, she is on prednisone and her HR has been elevated. Could the prednisone be the cause of it?   HR running -125,  BP readings 162/92 at urgent care from allergic reaction

## 2023-08-22 NOTE — TELEPHONE ENCOUNTER
Patient seem to be feeling better, her BP is now 121/86 and she is almost done with prednisone maybe 2 more days or sebastian. Patient will continue to monitor.

## 2023-09-15 ENCOUNTER — RA CDI HCC (OUTPATIENT)
Dept: OTHER | Facility: HOSPITAL | Age: 53
End: 2023-09-15

## 2023-09-15 NOTE — PROGRESS NOTES
[address] [facility] coding opportunities          Chart Reviewed number of suggestions sent to Provider: 1     Patients Insurance     Commercial Insurance: [employer]        J45.30:  Mild persistent asthma without complication [982392]     Found in active problem list - please review and assess and document per MEAT if applicable for 2527

## 2023-09-20 ENCOUNTER — TELEPHONE (OUTPATIENT)
Dept: OBGYN CLINIC | Facility: CLINIC | Age: 53
End: 2023-09-20

## 2023-10-11 ENCOUNTER — OFFICE VISIT (OUTPATIENT)
Dept: FAMILY MEDICINE CLINIC | Facility: CLINIC | Age: 53
End: 2023-10-11
Payer: COMMERCIAL

## 2023-10-11 VITALS
BODY MASS INDEX: 42.68 KG/M2 | DIASTOLIC BLOOD PRESSURE: 82 MMHG | HEIGHT: 64 IN | WEIGHT: 250 LBS | OXYGEN SATURATION: 97 % | TEMPERATURE: 97 F | HEART RATE: 70 BPM | SYSTOLIC BLOOD PRESSURE: 132 MMHG | RESPIRATION RATE: 16 BRPM

## 2023-10-11 DIAGNOSIS — J01.40 ACUTE NON-RECURRENT PANSINUSITIS: Primary | ICD-10-CM

## 2023-10-11 DIAGNOSIS — I10 PRIMARY HYPERTENSION: ICD-10-CM

## 2023-10-11 DIAGNOSIS — J45.30 MILD PERSISTENT ASTHMA WITHOUT COMPLICATION: ICD-10-CM

## 2023-10-11 PROCEDURE — 99214 OFFICE O/P EST MOD 30 MIN: CPT | Performed by: FAMILY MEDICINE

## 2023-10-11 RX ORDER — CLARITHROMYCIN 500 MG/1
500 TABLET, COATED ORAL EVERY 12 HOURS SCHEDULED
Qty: 20 TABLET | Refills: 0 | Status: SHIPPED | OUTPATIENT
Start: 2023-10-11 | End: 2023-10-21

## 2023-10-11 NOTE — PROGRESS NOTES
BMI Counseling: Body mass index is 43.25 kg/m². The BMI is above normal. Nutrition recommendations include decreasing portion sizes, decreasing fast food intake, consuming healthier snacks, limiting drinks that contain sugar and moderation in carbohydrate intake. Exercise recommendations include moderate physical activity 150 minutes/week, exercising 3-5 times per week and strength training exercises. No pharmacotherapy was ordered. Patient referred to PCP. Rationale for BMI follow-up plan is due to patient being overweight or obese. Depression Screening and Follow-up Plan: Patient was screened for depression during today's encounter. They screened negative with a PHQ-2 score of 0. Assessment/Plan:    1. Acute non-recurrent pansinusitis  -     clarithromycin (BIAXIN) 500 mg tablet; Take 1 tablet (500 mg total) by mouth every 12 (twelve) hours for 10 days    2. Mild persistent asthma without complication    3. Primary hypertension        There are no Patient Instructions on file for this visit. Return in about 6 months (around 4/11/2024). Subjective:      Patient ID: Gwendolyn Arce is a 47 y.o. female. Chief Complaint   Patient presents with    Follow-up     6 mo ck       Here for f/u. BP running 120-130/70-80's. She has been running a little higher more recently bc of resp illness and using her inhalers more regularly. Qvar has been better than pulmicort, which made her too jumpy. Struggling with fall allergies. Feeling worse the last 10 days or so. Sinus pressure. Laryngitis. Teacher at [employer]. Fluid in her head, ear pain. No fevers. No COVID in the house. The following portions of the patient's history were reviewed and updated as appropriate: allergies, current medications, past family history, past medical history, past social history, past surgical history and problem list.    Review of Systems   Constitutional:  Negative for fatigue and fever.    HENT:  Positive for ear pain, sinus pressure, sinus pain and sore throat. Negative for congestion. Eyes:  Negative for visual disturbance. Respiratory:  Positive for cough. Negative for chest tightness and shortness of breath. Cardiovascular:  Negative for chest pain and palpitations. Gastrointestinal:  Negative for abdominal pain. Genitourinary:  Negative for difficulty urinating. Musculoskeletal:  Negative for arthralgias. Neurological:  Negative for headaches. Hematological:  Does not bruise/bleed easily. Current Outpatient Medications   Medication Sig Dispense Refill    albuterol (ProAir HFA) 90 mcg/act inhaler Inhale 2 puffs every 4 (four) hours as needed for wheezing 18 g 1    amLODIPine (NORVASC) 10 mg tablet Take 1 tablet (10 mg total) by mouth daily 90 tablet 3    b complex vitamins capsule Take 1 capsule by mouth daily      beclomethasone (Qvar RediHaler) 40 MCG/ACT inhaler       clarithromycin (BIAXIN) 500 mg tablet Take 1 tablet (500 mg total) by mouth every 12 (twelve) hours for 10 days 20 tablet 0    Levocetirizine Dihydrochloride (XYZAL PO) Take by mouth      Multiple Vitamin-Folic Acid TABS Take by mouth      Triamcinolone Acetonide (NASACORT AQ NA) into each nostril      Blood Pressure KIT Use in the morning (Patient not taking: Reported on 10/11/2023) 1 kit 0     No current facility-administered medications for this visit. Objective:    /82 (BP Location: Right arm, Patient Position: Sitting, Cuff Size: Large)   Pulse 70   Temp (!) 97 °F (36.1 °C) (Temporal)   Resp 16   Ht 5' 3.75" (1.619 m)   Wt 113 kg (250 lb)   LMP 03/07/2023 (Approximate)   SpO2 97%   BMI 43.25 kg/m²        Physical Exam  Vitals reviewed. Constitutional:       Appearance: Normal appearance. She is well-developed.    HENT:      Head:      Comments: Sinus tenderness max and frontal sinuses     Right Ear: Tympanic membrane, ear canal and external ear normal.      Left Ear: Ear canal and external ear normal. There is impacted cerumen. Nose: Nose normal.      Mouth/Throat:      Mouth: Mucous membranes are moist.      Pharynx: Oropharynx is clear. Cardiovascular:      Rate and Rhythm: Normal rate and regular rhythm. Heart sounds: Normal heart sounds. Pulmonary:      Effort: Pulmonary effort is normal.      Breath sounds: Normal breath sounds. Skin:     General: Skin is warm. Neurological:      General: No focal deficit present. Mental Status: She is alert and oriented to person, place, and time. Psychiatric:         Mood and Affect: Mood normal.         Behavior: Behavior normal.         Thought Content:  Thought content normal.         Judgment: Judgment normal.                Julia Mason MD

## 2023-12-04 ENCOUNTER — OFFICE VISIT (OUTPATIENT)
Dept: FAMILY MEDICINE CLINIC | Facility: CLINIC | Age: 53
End: 2023-12-04
Payer: COMMERCIAL

## 2023-12-04 VITALS
SYSTOLIC BLOOD PRESSURE: 134 MMHG | OXYGEN SATURATION: 99 % | DIASTOLIC BLOOD PRESSURE: 76 MMHG | HEIGHT: 64 IN | HEART RATE: 76 BPM | TEMPERATURE: 97.4 F | BODY MASS INDEX: 42.65 KG/M2 | WEIGHT: 249.8 LBS

## 2023-12-04 DIAGNOSIS — J20.9 BRONCHITIS WITH BRONCHOSPASM: ICD-10-CM

## 2023-12-04 DIAGNOSIS — J01.10 ACUTE NON-RECURRENT FRONTAL SINUSITIS: Primary | ICD-10-CM

## 2023-12-04 DIAGNOSIS — J01.10 ACUTE NON-RECURRENT FRONTAL SINUSITIS: ICD-10-CM

## 2023-12-04 DIAGNOSIS — J45.31 MILD PERSISTENT ASTHMA WITH ACUTE EXACERBATION: ICD-10-CM

## 2023-12-04 PROCEDURE — 99214 OFFICE O/P EST MOD 30 MIN: CPT | Performed by: FAMILY MEDICINE

## 2023-12-04 RX ORDER — BENZONATATE 100 MG/1
100 CAPSULE ORAL 3 TIMES DAILY PRN
Qty: 30 CAPSULE | Refills: 1 | Status: SHIPPED | OUTPATIENT
Start: 2023-12-04

## 2023-12-04 RX ORDER — CLARITHROMYCIN 500 MG/1
500 TABLET, COATED ORAL EVERY 12 HOURS SCHEDULED
Qty: 20 TABLET | Refills: 0 | Status: CANCELLED | OUTPATIENT
Start: 2023-12-04 | End: 2023-12-14

## 2023-12-04 RX ORDER — CLARITHROMYCIN 500 MG/1
500 TABLET, COATED ORAL EVERY 12 HOURS SCHEDULED
Qty: 20 TABLET | Refills: 0 | Status: SHIPPED | OUTPATIENT
Start: 2023-12-04 | End: 2023-12-14

## 2023-12-04 RX ORDER — BENZONATATE 100 MG/1
100 CAPSULE ORAL 3 TIMES DAILY PRN
Qty: 30 CAPSULE | Refills: 1 | Status: SHIPPED | OUTPATIENT
Start: 2023-12-04 | End: 2023-12-04 | Stop reason: SDUPTHER

## 2023-12-04 RX ORDER — METHYLPREDNISOLONE 4 MG/1
TABLET ORAL
Qty: 21 EACH | Refills: 0 | Status: CANCELLED | OUTPATIENT
Start: 2023-12-04

## 2023-12-04 RX ORDER — BECLOMETHASONE DIPROPIONATE HFA 40 UG/1
2 AEROSOL, METERED RESPIRATORY (INHALATION) 2 TIMES DAILY
Qty: 10.6 G | Refills: 1 | Status: SHIPPED | OUTPATIENT
Start: 2023-12-04 | End: 2023-12-04 | Stop reason: SDUPTHER

## 2023-12-04 RX ORDER — BECLOMETHASONE DIPROPIONATE HFA 40 UG/1
2 AEROSOL, METERED RESPIRATORY (INHALATION) 2 TIMES DAILY
Qty: 10.6 G | Refills: 1 | Status: SHIPPED | OUTPATIENT
Start: 2023-12-04

## 2023-12-04 RX ORDER — METHYLPREDNISOLONE 4 MG/1
TABLET ORAL
Qty: 21 EACH | Refills: 0 | Status: SHIPPED | OUTPATIENT
Start: 2023-12-04

## 2023-12-04 NOTE — TELEPHONE ENCOUNTER
Duplicate order-  PT is currently sitting at [facility] and there system went down for 10mins. They do not have any of the scripts Dr. Danielle Query sent today.   Please resend to [email or web address] #[phone]

## 2023-12-04 NOTE — LETTER
December 4, 2023     Patient: Megan Robbins  YOB: 1970  Date of Visit: 12/4/2023      To Whom it May Concern:    Megan Robbins is under my professional care. Monae Lorenz was seen in my office on 12/4/2023. Monae Lorenz may return to work on 12/6/23 . If you have any questions or concerns, please don't hesitate to call.          Sincerely,          Demetria Jorgensen MD        CC: No Recipients

## 2023-12-04 NOTE — PROGRESS NOTES
Assessment/Plan:    1. Acute non-recurrent frontal sinusitis  -     clarithromycin (BIAXIN) 500 mg tablet; Take 1 tablet (500 mg total) by mouth every 12 (twelve) hours for 10 days    2. Bronchitis with bronchospasm  -     clarithromycin (BIAXIN) 500 mg tablet; Take 1 tablet (500 mg total) by mouth every 12 (twelve) hours for 10 days  -     methylPREDNISolone 4 MG tablet therapy pack; Use as directed on package  -     benzonatate (TESSALON PERLES) 100 mg capsule; Take 1 capsule (100 mg total) by mouth 3 (three) times a day as needed for cough  -     beclomethasone (Qvar RediHaler) 40 MCG/ACT inhaler; Inhale 2 puffs 2 (two) times a day    3. Mild persistent asthma with acute exacerbation  -     clarithromycin (BIAXIN) 500 mg tablet; Take 1 tablet (500 mg total) by mouth every 12 (twelve) hours for 10 days  -     methylPREDNISolone 4 MG tablet therapy pack; Use as directed on package  -     benzonatate (TESSALON PERLES) 100 mg capsule; Take 1 capsule (100 mg total) by mouth 3 (three) times a day as needed for cough  -     beclomethasone (Qvar RediHaler) 40 MCG/ACT inhaler; Inhale 2 puffs 2 (two) times a day          There are no Patient Instructions on file for this visit. Return if symptoms worsen or fail to improve. Subjective:      Patient ID: Shayy Cronin is a 47 y.o. female. Chief Complaint   Patient presents with    Cough    Headache       Cough started on 12/1. Didn't have congestion to begin with, byt does have congestion now. Localizes pain to her eyebrow region. Lots of postnasal drip which makes her cough more, then needs to use the inhaler for relief. Headache present since this morning. Now notices slight chest pain when coughing. Using both inhalers, worked until today. Notices that she is breathing heavier when she is going up the stairs, typical of her asthma exacerbations. 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 Cough  This is a new problem.  The current episode started in the past 7 days. The problem has been gradually worsening. The problem occurs hourly. The cough is Productive of sputum. Associated symptoms include headaches, nasal congestion, postnasal drip and shortness of breath. Pertinent negatives include no chest pain, chills, ear congestion, ear pain, fever, heartburn, hemoptysis, myalgias, rash, rhinorrhea, sore throat, sweats, weight loss or wheezing. The symptoms are aggravated by lying down. The following portions of the patient's history were reviewed and updated as appropriate:  past social history    Review of Systems   Constitutional:  Negative for chills, fever and weight loss. HENT:  Positive for congestion, postnasal drip, sinus pressure and sinus pain. Negative for ear pain, rhinorrhea, sore throat and trouble swallowing. Respiratory:  Positive for cough and shortness of breath. Negative for hemoptysis, chest tightness and wheezing. Cardiovascular:  Negative for chest pain. Gastrointestinal:  Negative for heartburn and nausea. Musculoskeletal:  Negative for myalgias. Skin:  Negative for rash. Neurological:  Positive for dizziness and headaches. Negative for weakness and light-headedness.          Current Outpatient Medications   Medication Sig Dispense Refill    albuterol (ProAir HFA) 90 mcg/act inhaler Inhale 2 puffs every 4 (four) hours as needed for wheezing 18 g 1    amLODIPine (NORVASC) 10 mg tablet Take 1 tablet (10 mg total) by mouth daily 90 tablet 3    b complex vitamins capsule Take 1 capsule by mouth daily      beclomethasone (Qvar RediHaler) 40 MCG/ACT inhaler Inhale 2 puffs 2 (two) times a day 10.6 g 1    benzonatate (TESSALON PERLES) 100 mg capsule Take 1 capsule (100 mg total) by mouth 3 (three) times a day as needed for cough 30 capsule 1    clarithromycin (BIAXIN) 500 mg tablet Take 1 tablet (500 mg total) by mouth every 12 (twelve) hours for 10 days 20 tablet 0    Levocetirizine Dihydrochloride (XYZAL PO) Take by mouth      methylPREDNISolone 4 MG tablet therapy pack Use as directed on package 21 each 0    Multiple Vitamin-Folic Acid TABS Take by mouth      Triamcinolone Acetonide (NASACORT AQ NA) into each nostril      Blood Pressure KIT Use in the morning (Patient not taking: Reported on 10/11/2023) 1 kit 0     No current facility-administered medications for this visit. Objective:    /76 (BP Location: Right arm, Patient Position: Sitting, Cuff Size: Large)   Pulse 76   Temp (!) 97.4 °F (36.3 °C) (Temporal)   Ht 5' 3.75" (1.619 m)   Wt 113 kg (249 lb 12.8 oz)   LMP 03/07/2023 (Approximate)   SpO2 99%   BMI 43.22 kg/m²      Physical Exam  Vitals reviewed. Constitutional:       Appearance: Normal appearance. She is well-developed. She is not toxic-appearing. HENT:      Head:      Comments: Frontal sinus tenderness     Right Ear: Tympanic membrane, ear canal and external ear normal.      Left Ear: Tympanic membrane, ear canal and external ear normal.      Nose: Congestion and rhinorrhea present. Mouth/Throat:      Pharynx: No oropharyngeal exudate. Eyes:      Pupils: Pupils are equal, round, and reactive to light. Cardiovascular:      Rate and Rhythm: Normal rate and regular rhythm. Heart sounds: Normal heart sounds. Pulmonary:      Effort: Pulmonary effort is normal.      Breath sounds: Normal breath sounds. No wheezing. Chest:      Chest wall: No tenderness. Lymphadenopathy:      Cervical: No cervical adenopathy. Skin:     General: Skin is warm. Neurological:      General: No focal deficit present. Mental Status: She is alert and oriented to person, place, and time. Psychiatric:         Mood and Affect: Mood normal.         Behavior: Behavior normal.         Thought Content:  Thought content normal.         Judgment: Judgment normal.             Edwardo Cockayne, MD

## 2023-12-12 ENCOUNTER — ANNUAL EXAM (OUTPATIENT)
Dept: OBGYN CLINIC | Facility: MEDICAL CENTER | Age: 53
End: 2023-12-12

## 2023-12-12 VITALS
BODY MASS INDEX: 41.93 KG/M2 | DIASTOLIC BLOOD PRESSURE: 78 MMHG | WEIGHT: 245.6 LBS | SYSTOLIC BLOOD PRESSURE: 120 MMHG | HEART RATE: 74 BPM | HEIGHT: 64 IN

## 2023-12-12 DIAGNOSIS — J20.9 BRONCHITIS WITH BRONCHOSPASM: Primary | ICD-10-CM

## 2023-12-12 DIAGNOSIS — Z01.419 ROUTINE GYNECOLOGICAL EXAMINATION: Primary | ICD-10-CM

## 2023-12-12 DIAGNOSIS — R92.2 DENSE BREAST: ICD-10-CM

## 2023-12-12 DIAGNOSIS — N39.46 MIXED INCONTINENCE: ICD-10-CM

## 2023-12-12 DIAGNOSIS — R92.30 DENSE BREAST: ICD-10-CM

## 2023-12-12 RX ORDER — CEPHALEXIN 500 MG/1
500 CAPSULE ORAL 2 TIMES DAILY
Qty: 20 CAPSULE | Refills: 0 | Status: SHIPPED | OUTPATIENT
Start: 2023-12-12 | End: 2023-12-22

## 2023-12-12 NOTE — PROGRESS NOTES
Assessment   47 y.o. postmenopausal female presenting for annual exam.     Plan   Diagnoses and all orders for this visit:    Routine gynecological examination  Normal findings on routine exam.  Encouraged 150 min of exercise per week. Reviewed balanced diet. Multivitamin encouraged   Breast awareness/SBE encouraged    Dense breast  Last mammogram 04/03/2023 BIRADS 2 - Benign, ;LTC  25.99, heterogeneously dense   Again reviewed option for additional screening with breast MRI. Due for mammogram in April. Will obtain and revisit after next mammogram.     Mixed incontinence  -     Ambulatory Referral to Urogynecology; Future  -     Ambulatory referral to Physical Therapy; Future    Prior sling with Dr. Taylor Steven -- encouraged f/u relating to worsening JAYLON  Discussed lifestyle changes and bladder training to improve urinary urgency and UUI. Reviewed benefit of pelvic floor PT and weight reduction for improvement of sx. Referrals provided. Pap - due 2026  Mammo scheduled    Colonoscopy due 2024     RTO one year for yearly exam or sooner as needed. __________________________________________________________________      Missy Gudio is a 47 y.o. premenopausal female presenting for annual exam.     Family is well. Just got a new boxer puppy. Not exercising regularly -- feels she doesn't have time     SCREENING  Last Pap: 11/01/2021 NILM/hpv neg   Last Mammo: 04/03/2023 BIRADS 2 - Benign, ;LTC  25.99, heterogeneously dense   Last Colonoscopy: 6/7/21 Neg   Last DEXA: n/a     GYN  Periods are irregular -- going up 8-9 months between. Very light. Denies vaginal bleeding, dryness, vaginal discharge, itching, odor, pelvic pain and vulvar/vaginal symptoms    Sexually active: Yes - single partner -   Concerns: Dryness managed with a lubricant. Denies pain or bleeding. Hx Abnormal pap: denies  We reviewed ASCCP guidelines for Pap testing today. OB  [id number]      Complaints: denies  Denies leakage/difficulty urinating, dysuria, hematuria, and urinary frequency/urgency    BREAST  Complaints: denies  Denies: breast lump, breast tenderness, dryness, nipple discharge, pruritus, skin color change, and skin lesion(s)    Pertinent Family Hx:   Family hx of breast cancer: PGM (80)   Family hx of ovarian cancer: no  Family hx of colon cancer: no      Past Medical History:   Diagnosis Date    Asthma Birth    Hypertension 11/2022    Due to covid    Pulmonary disease        Past Surgical History:   Procedure Laterality Date    BLADDER SURGERY      HAND RECONSTRUCTION Bilateral 5/2019-11/2019         Current Outpatient Medications:     albuterol (ProAir HFA) 90 mcg/act inhaler, Inhale 2 puffs every 4 (four) hours as needed for wheezing, Disp: 18 g, Rfl: 1    amLODIPine (NORVASC) 10 mg tablet, Take 1 tablet (10 mg total) by mouth daily, Disp: 90 tablet, Rfl: 3    b complex vitamins capsule, Take 1 capsule by mouth daily, Disp: , Rfl:     beclomethasone (Qvar RediHaler) 40 MCG/ACT inhaler, Inhale 2 puffs 2 (two) times a day, Disp: 10.6 g, Rfl: 1    Levocetirizine Dihydrochloride (XYZAL PO), Take by mouth, Disp: , Rfl:     Multiple Vitamin-Folic Acid TABS, Take by mouth, Disp: , Rfl:     Triamcinolone Acetonide (NASACORT AQ NA), into each nostril, Disp: , Rfl:     benzonatate (TESSALON PERLES) 100 mg capsule, Take 1 capsule (100 mg total) by mouth 3 (three) times a day as needed for cough (Patient not taking: Reported on 12/12/2023), Disp: 30 capsule, Rfl: 1    Blood Pressure KIT, Use in the morning (Patient not taking: Reported on 10/11/2023), Disp: 1 kit, Rfl: 0    clarithromycin (BIAXIN) 500 mg tablet, Take 1 tablet (500 mg total) by mouth every 12 (twelve) hours for 10 days (Patient not taking: Reported on 12/12/2023), Disp: 20 tablet, Rfl: 0    methylPREDNISolone 4 MG tablet therapy pack, Use as directed on package, Disp: 21 each, Rfl: 0    Allergies   Allergen Reactions    Erythromycin GI Intolerance    Penicillins GI Intolerance     Stomach upset, needs to eat meal before taking med        Social History     Socioeconomic History    Marital status: /Civil Union     Spouse name: Not on file    Number of children: Not on file    Years of education: Not on file    Highest education level: Not on file   Occupational History    Not on file   Tobacco Use    Smoking status: Never    Smokeless tobacco: Never   Vaping Use    Vaping Use: Never used   Substance and Sexual Activity    Alcohol use: Yes     Alcohol/week: 1.0 standard drink of alcohol     Types: 1 Glasses of wine per week     Comment: social    Drug use: No    Sexual activity: Yes     Partners: Male     Birth control/protection: Condom Male   Other Topics Concern    Not on file   Social History Narrative    Not on file     Social Determinants of Health     Financial Resource Strain: Not on file   Food Insecurity: Not on file   Transportation Needs: Not on file   Physical Activity: Not on file   Stress: Not on file   Social Connections: Not on file   Intimate Partner Violence: Not on file   Housing Stability: Not on file         Review of Systems   Constitutional: Negative. Respiratory: Negative. Cardiovascular: Negative   Gastrointestinal: Negative   Breasts: As noted above. Genitourinary: As noted above. Psychiatric: Negative       Objective      /78 (BP Location: Left arm, Patient Position: Sitting, Cuff Size: Large)   Pulse 74   Ht 5' 4" (1.626 m)   Wt 111 kg (245 lb 9.6 oz)   LMP 03/07/2023 (Approximate)   BMI 42.16 kg/m²     Physical Examination:  Patient appears well and is not in distress  Breasts are symmetrical without mass, tenderness, nipple discharge, skin changes or adenopathy. Left nipple inversion, which she reports has been present for years and has had negative imaging since then. Abdomen is soft and nontender without masses.    External genitals are normal without lesions or rashes. Urethral meatus and urethra are normal  Bladder is normal to palpation  Vagina is normal without discharge or bleeding. Cervix is normal without discharge or lesion. Uterus is normal, mobile, nontender without palpable mass. Adnexa are normal, nontender, without palpable mass.

## 2024-02-21 ENCOUNTER — OFFICE VISIT (OUTPATIENT)
Dept: FAMILY MEDICINE CLINIC | Facility: CLINIC | Age: 54
End: 2024-02-21
Payer: COMMERCIAL

## 2024-02-21 ENCOUNTER — TELEPHONE (OUTPATIENT)
Age: 54
End: 2024-02-21

## 2024-02-21 VITALS
SYSTOLIC BLOOD PRESSURE: 132 MMHG | HEART RATE: 72 BPM | OXYGEN SATURATION: 99 % | DIASTOLIC BLOOD PRESSURE: 84 MMHG | WEIGHT: 247.2 LBS | TEMPERATURE: 97.4 F | HEIGHT: 64 IN | BODY MASS INDEX: 42.2 KG/M2

## 2024-02-21 DIAGNOSIS — E66.01 CLASS 3 SEVERE OBESITY WITH SERIOUS COMORBIDITY AND BODY MASS INDEX (BMI) OF 40.0 TO 44.9 IN ADULT, UNSPECIFIED OBESITY TYPE (HCC): ICD-10-CM

## 2024-02-21 DIAGNOSIS — Z13.220 SCREENING, LIPID: ICD-10-CM

## 2024-02-21 DIAGNOSIS — R03.0 ELEVATED BLOOD PRESSURE READING: Primary | ICD-10-CM

## 2024-02-21 PROCEDURE — 99213 OFFICE O/P EST LOW 20 MIN: CPT | Performed by: INTERNAL MEDICINE

## 2024-02-21 RX ORDER — METOPROLOL SUCCINATE 50 MG/1
50 TABLET, EXTENDED RELEASE ORAL DAILY
Qty: 30 TABLET | Refills: 5 | Status: SHIPPED | OUTPATIENT
Start: 2024-02-21

## 2024-02-21 NOTE — PROGRESS NOTES
Name: Rita Cardona      : 1970      MRN: [id number]  Encounter Provider: Flor Ryan MD  Encounter Date: 2024   Encounter department: [facility]    Assessment & Plan     1. Elevated blood pressure reading  Assessment & Plan:  Ed metoprolol XL 50 mg to her meds to see if they can get her to calm down and to get her blood pressure down a little bit. So ordered blood work since she is not had any for several years    Orders:  -     metoprolol succinate (Toprol XL) 50 mg 24 hr tablet; Take 1 tablet (50 mg total) by mouth daily  -     Comprehensive metabolic panel; Future    2. Class 3 severe obesity with serious comorbidity and body mass index (BMI) of 40.0 to 44.9 in adult, unspecified obesity type (HCC)  -     TSH, 3rd generation with Free T4 reflex; Future    3. Screening, lipid  -     Lipid panel; Future    4. BMI 40.0-44.9, adult (HCC)  Assessment & Plan:  Also was counseled that as little as a 20 pound weight loss can have an effect on lowering blood pressure             Subjective      Hypertension  This is a chronic problem. The current episode started more than 1 year ago. The problem has been waxing and waning since onset. The problem is uncontrolled. Associated symptoms include anxiety, palpitations and shortness of breath. Pertinent negatives include no chest pain. There are no associated agents to hypertension. Past treatments include calcium channel blockers and lifestyle changes. The current treatment provides moderate improvement. Compliance problems include diet.  There is no history of a hypertension causing med, renovascular disease or a thyroid problem.     Review of Systems   Constitutional:  Negative for chills and fever.   HENT:  Negative for ear pain and sore throat.    Eyes:  Negative for pain and visual disturbance.   Respiratory:  Positive for shortness of breath. Negative for cough.    Cardiovascular:  Positive for palpitations. Negative for chest  "pain.   Gastrointestinal:  Negative for abdominal pain and vomiting.   Genitourinary:  Negative for dysuria and hematuria.   Musculoskeletal:  Negative for arthralgias and back pain.   Skin:  Negative for color change and rash.   Neurological:  Negative for seizures and syncope.   All other systems reviewed and are negative.      Current Outpatient Medications on File Prior to Visit   Medication Sig    albuterol (ProAir HFA) 90 mcg/act inhaler Inhale 2 puffs every 4 (four) hours as needed for wheezing    amLODIPine (NORVASC) 10 mg tablet Take 1 tablet (10 mg total) by mouth daily    b complex vitamins capsule Take 1 capsule by mouth daily    beclomethasone (Qvar RediHaler) 40 MCG/ACT inhaler Inhale 2 puffs 2 (two) times a day    Levocetirizine Dihydrochloride (XYZAL PO) Take by mouth    Multiple Vitamin-Folic Acid TABS Take by mouth    Triamcinolone Acetonide (NASACORT AQ NA) into each nostril    benzonatate (TESSALON PERLES) 100 mg capsule Take 1 capsule (100 mg total) by mouth 3 (three) times a day as needed for cough (Patient not taking: Reported on 12/12/2023)    Blood Pressure KIT Use in the morning (Patient not taking: Reported on 10/11/2023)    [DISCONTINUED] methylPREDNISolone 4 MG tablet therapy pack Use as directed on package       Objective     /84 (BP Location: Left arm, Patient Position: Sitting, Cuff Size: Large)   Pulse 72   Temp (!) 97.4 °F (36.3 °C) (Temporal)   Ht 5' 4\" (1.626 m)   Wt 112 kg (247 lb 3.2 oz)   LMP 10/22/2023 (Exact Date)   SpO2 99%   BMI 42.43 kg/m²     Physical Exam  Constitutional:       Appearance: She is obese. She is not ill-appearing.   HENT:      Head: Normocephalic and atraumatic.      Nose: Nose normal.   Neck:      Vascular: No carotid bruit.      Comments: Thyroid Megaly  Cardiovascular:      Rate and Rhythm: Normal rate and regular rhythm.      Heart sounds: No murmur heard.  Pulmonary:      Effort: Pulmonary effort is normal.      Breath sounds: Normal " breath sounds.   Musculoskeletal:      Right lower leg: No edema.      Left lower leg: No edema.   Skin:     Findings: No rash.   Neurological:      General: No focal deficit present.   Psychiatric:      Comments: Anxious       Flor Ryan MD

## 2024-02-21 NOTE — ASSESSMENT & PLAN NOTE
Ed metoprolol XL 50 mg to her meds to see if they can get her to calm down and to get her blood pressure down a little bit. So ordered blood work since she is not had any for several years

## 2024-02-21 NOTE — ASSESSMENT & PLAN NOTE
Also was counseled that as little as a 20 pound weight loss can have an effect on lowering blood pressure

## 2024-02-21 NOTE — TELEPHONE ENCOUNTER
Pt called stating her BP has been high the last couple of days  Today 148/80  129/78  128/90  132/79    She's most concerned about the bottom number. She has been taking her medication religiously.  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   Pt is asking to see Dr HARP Today. She currently does not have openings but seeing if Dr. COLÓN would be able to get pt in or speak with pt    Nurses office at her school: [phone]  Her cell: [phone]

## 2024-02-25 ENCOUNTER — NURSE TRIAGE (OUTPATIENT)
Dept: OTHER | Facility: OTHER | Age: 54
End: 2024-02-25

## 2024-02-25 DIAGNOSIS — U07.1 COVID-19: Primary | ICD-10-CM

## 2024-02-25 RX ORDER — NIRMATRELVIR AND RITONAVIR 300-100 MG
3 KIT ORAL 2 TIMES DAILY
Qty: 30 TABLET | Refills: 0 | Status: SHIPPED | OUTPATIENT
Start: 2024-02-25 | End: 2024-02-29 | Stop reason: ALTCHOICE

## 2024-02-25 NOTE — TELEPHONE ENCOUNTER
"Answer Assessment - Initial Assessment Questions  1. COVID-19 DIAGNOSIS: \"Who made your COVID-19 diagnosis?\" \"Was it confirmed by a positive lab test or self-test?\" If not diagnosed by a doctor (or NP/PA), ask \"Are there lots of cases (community spread) where you live?\" Note: See public health department website, if unsure.      Positive test yesterday     2. COVID-19 EXPOSURE: \"Was there any known exposure to COVID before the symptoms began?\" CDC Definition of close contact: within 6 feet (2 meters) for a total of 15 minutes or more over a 24-hour period.       Son was sick      Patient is a teacher    3. ONSET: \"When did the COVID-19 symptoms start?\"       Friday night     4. WORST SYMPTOM: \"What is your worst symptom?\" (e.g., cough, fever, shortness of breath, muscle aches)      Cough, SOB    5. COUGH: \"Do you have a cough?\" If Yes, ask: \"How bad is the cough?\"        Moist, productive cough- yellow and white mucus     6. FEVER: \"Do you have a fever?\" If Yes, ask: \"What is your temperature, how was it measured, and when did it start?\"      100 oral     7. RESPIRATORY STATUS: \"Describe your breathing?\" (e.g., shortness of breath, wheezing, unable to speak)       SOB- gets out breath with exertion, improves with rest       Denies wheezing- has been using inhalers    8. BETTER-SAME-WORSE: \"Are you getting better, staying the same or getting worse compared to yesterday?\"  If getting worse, ask, \"In what way?\"      Slightly better at times     9. HIGH RISK DISEASE: \"Do you have any chronic medical problems?\" (e.g., asthma, heart or lung disease, weak immune system, obesity, etc.)      Asthma, BMI- 42.43, hypertension     10. VACCINE: \"Have you had the COVID-19 vaccine?\" If Yes, ask: \"Which one, how many shots, when did you get it?\"        Yes- got a total of 3     11. BOOSTER: \"Have you received your COVID-19 booster?\" If Yes, ask: \"Which one and when did you get it?\"        Not up to date     12. PREGNANCY: \"Is there " "any chance you are pregnant?\" \"When was your last menstrual period?\"        N/a    13. OTHER SYMPTOMS: \"Do you have any other symptoms?\"  (e.g., chills, fatigue, headache, loss of smell or taste, muscle pain, sore throat)        Sore throat, some joint pain in right hand     14. O2 SATURATION MONITOR:  \"Do you use an oxygen saturation monitor (pulse oximeter) at home?\" If Yes, ask \"What is your reading (oxygen level) today?\" \"What is your usual oxygen saturation reading?\" (e.g., 95%)        92-96%    Protocols used: Coronavirus (COVID-19) Diagnosed or Suspected-ADULT-AH    "

## 2024-02-25 NOTE — TELEPHONE ENCOUNTER
"Regarding: Covid positive/ fever/shorness of breath/ congestion  ----- Message from Essence Barrera sent at 2/25/2024 12:18 PM EST -----  Pt stated \" I tested positive for COVID , I am experiencing a low grade fever, shortness of breath, and a stuffy nose. I have long term Covid from a year ago which affects my blood pressure and I  wanted to know what to do.\"    "

## 2024-02-26 DIAGNOSIS — J45.30 MILD PERSISTENT ASTHMA WITHOUT COMPLICATION: ICD-10-CM

## 2024-02-26 RX ORDER — ALBUTEROL SULFATE 90 UG/1
2 AEROSOL, METERED RESPIRATORY (INHALATION) EVERY 4 HOURS PRN
Qty: 18 G | Refills: 1 | Status: SHIPPED | OUTPATIENT
Start: 2024-02-26

## 2024-02-26 NOTE — TELEPHONE ENCOUNTER
Patient is requesting if you could prescribe an incentive spirometer for her breathing? [facility] [place] [facility]. She is scheduled to see you on 2/29/24

## 2024-02-29 ENCOUNTER — OFFICE VISIT (OUTPATIENT)
Dept: FAMILY MEDICINE CLINIC | Facility: CLINIC | Age: 54
End: 2024-02-29
Payer: COMMERCIAL

## 2024-02-29 VITALS
WEIGHT: 246.2 LBS | HEART RATE: 61 BPM | HEIGHT: 64 IN | TEMPERATURE: 97.8 F | BODY MASS INDEX: 42.03 KG/M2 | SYSTOLIC BLOOD PRESSURE: 128 MMHG | OXYGEN SATURATION: 94 % | DIASTOLIC BLOOD PRESSURE: 70 MMHG

## 2024-02-29 DIAGNOSIS — J20.9 BRONCHITIS WITH BRONCHOSPASM: ICD-10-CM

## 2024-02-29 DIAGNOSIS — J45.31 MILD PERSISTENT ASTHMA WITH ACUTE EXACERBATION: ICD-10-CM

## 2024-02-29 DIAGNOSIS — I10 PRIMARY HYPERTENSION: Primary | ICD-10-CM

## 2024-02-29 PROCEDURE — 99214 OFFICE O/P EST MOD 30 MIN: CPT | Performed by: FAMILY MEDICINE

## 2024-02-29 RX ORDER — BECLOMETHASONE DIPROPIONATE HFA 80 UG/1
2 AEROSOL, METERED RESPIRATORY (INHALATION) 2 TIMES DAILY
Qty: 10.6 G | Refills: 1 | Status: SHIPPED | OUTPATIENT
Start: 2024-02-29

## 2024-02-29 NOTE — PROGRESS NOTES
Assessment/Plan:    1. Primary hypertension    2. Bronchitis with bronchospasm  -     beclomethasone (Qvar RediHaler) 80 MCG/ACT inhaler; Inhale 2 puffs 2 (two) times a day Rinse mouth after use.    3. Mild persistent asthma with acute exacerbation        There are no Patient Instructions on file for this visit.    No follow-ups on file.    Subjective:      Patient ID: Rita Cardona is a 53 y.o. female.    Chief Complaint   Patient presents with    Blood Pressure Check    Nasal Congestion       Here for cough and BP. Pt noticed her BP was climbing last week, was seen for BP, started on metoprolol., few days later she developed COVID. She finished a course of paxlovid yesterday. BP at home running 110-120/70's. Still on amlodipine and metoprolol. Using the qvar as prescribed, but still needing the rescue 4 times a day. She has noticed some improvement with the addition of the ICS compared to previous infections with just the rescue. Has tessalon perles but they haven't worked for her COVID assoc cough.         The following portions of the patient's history were reviewed and updated as appropriate: allergies, current medications, past family history, past medical history, past social history, past surgical history and problem list.    Review of Systems   Constitutional:  Negative for fatigue and fever.   HENT:  Negative for congestion.    Eyes:  Negative for visual disturbance.   Respiratory:  Positive for cough and shortness of breath. Negative for chest tightness.    Cardiovascular:  Negative for chest pain and palpitations.   Gastrointestinal:  Negative for abdominal pain.   Genitourinary:  Negative for difficulty urinating.   Musculoskeletal:  Negative for arthralgias.   Neurological:  Negative for headaches.   Hematological:  Does not bruise/bleed easily.         Current Outpatient Medications   Medication Sig Dispense Refill    albuterol (ProAir HFA) 90 mcg/act inhaler Inhale 2 puffs every 4 (four)  "hours as needed for wheezing 18 g 1    amLODIPine (NORVASC) 10 mg tablet Take 1 tablet (10 mg total) by mouth daily 90 tablet 3    b complex vitamins capsule Take 1 capsule by mouth daily      beclomethasone (Qvar RediHaler) 80 MCG/ACT inhaler Inhale 2 puffs 2 (two) times a day Rinse mouth after use. 10.6 g 1    Levocetirizine Dihydrochloride (XYZAL PO) Take by mouth      metoprolol succinate (Toprol XL) 50 mg 24 hr tablet Take 1 tablet (50 mg total) by mouth daily 30 tablet 5    Multiple Vitamin-Folic Acid TABS Take by mouth      Triamcinolone Acetonide (NASACORT AQ NA) into each nostril      benzonatate (TESSALON PERLES) 100 mg capsule Take 1 capsule (100 mg total) by mouth 3 (three) times a day as needed for cough (Patient not taking: Reported on 12/12/2023) 30 capsule 1     No current facility-administered medications for this visit.       Objective:    /70 (BP Location: Right arm, Patient Position: Sitting, Cuff Size: Large)   Pulse 61   Temp 97.8 °F (36.6 °C) (Temporal)   Ht 5' 4\" (1.626 m)   Wt 112 kg (246 lb 3.2 oz)   LMP 10/22/2023 (Exact Date)   SpO2 94%   BMI 42.26 kg/m²        Physical Exam  Vitals reviewed.   Constitutional:       Appearance: Normal appearance. She is well-developed.   HENT:      Right Ear: Tympanic membrane, ear canal and external ear normal.      Left Ear: Tympanic membrane, ear canal and external ear normal.   Cardiovascular:      Rate and Rhythm: Normal rate and regular rhythm.      Heart sounds: Normal heart sounds.   Pulmonary:      Effort: Pulmonary effort is normal.      Breath sounds: Normal breath sounds.   Lymphadenopathy:      Cervical: No cervical adenopathy.   Skin:     General: Skin is warm.   Neurological:      General: No focal deficit present.      Mental Status: She is alert and oriented to person, place, and time.   Psychiatric:         Mood and Affect: Mood normal.         Behavior: Behavior normal.         Thought Content: Thought content normal.       "   Judgment: Judgment normal.                Coby Soto MD

## 2024-03-15 DIAGNOSIS — R03.0 ELEVATED BLOOD PRESSURE READING: ICD-10-CM

## 2024-03-15 RX ORDER — METOPROLOL SUCCINATE 50 MG/1
50 TABLET, EXTENDED RELEASE ORAL DAILY
Qty: 90 TABLET | Refills: 1 | Status: SHIPPED | OUTPATIENT
Start: 2024-03-15

## 2024-04-10 ENCOUNTER — RA CDI HCC (OUTPATIENT)
Dept: OTHER | Facility: HOSPITAL | Age: 54
End: 2024-04-10

## 2024-04-17 ENCOUNTER — OFFICE VISIT (OUTPATIENT)
Dept: FAMILY MEDICINE CLINIC | Facility: CLINIC | Age: 54
End: 2024-04-17
Payer: COMMERCIAL

## 2024-04-17 VITALS
DIASTOLIC BLOOD PRESSURE: 76 MMHG | SYSTOLIC BLOOD PRESSURE: 118 MMHG | OXYGEN SATURATION: 97 % | TEMPERATURE: 97.2 F | HEART RATE: 56 BPM | WEIGHT: 250 LBS | BODY MASS INDEX: 42.91 KG/M2

## 2024-04-17 DIAGNOSIS — I10 PRIMARY HYPERTENSION: Primary | ICD-10-CM

## 2024-04-17 DIAGNOSIS — R03.0 ELEVATED BLOOD PRESSURE READING: ICD-10-CM

## 2024-04-17 DIAGNOSIS — J45.30 MILD PERSISTENT ASTHMA WITHOUT COMPLICATION: ICD-10-CM

## 2024-04-17 PROCEDURE — 99214 OFFICE O/P EST MOD 30 MIN: CPT | Performed by: FAMILY MEDICINE

## 2024-04-17 RX ORDER — METOPROLOL SUCCINATE 50 MG/1
25 TABLET, EXTENDED RELEASE ORAL DAILY
Start: 2024-04-17

## 2024-04-17 NOTE — PATIENT INSTRUCTIONS
He metoprolol to 25mg (1/2 tab). If still running too low, stop the metoprolol altogether and just take the amlodipine for BP control. Then use the metoprolol as needed if BP spikes during illness or otherwise. Aim for -130/70-80's.

## 2024-04-17 NOTE — PROGRESS NOTES
Assessment/Plan:    1. Primary hypertension  -     CBC and differential; Future  -     Comprehensive metabolic panel; Future  -     TSH, 3rd generation; Future  -     Lipid panel; Future    2. Elevated blood pressure reading  -     metoprolol succinate (TOPROL-XL) 50 mg 24 hr tablet; Take 0.5 tablets (25 mg total) by mouth daily    3. Mild persistent asthma without complication        Patient Instructions   He metoprolol to 25mg (1/2 tab). If still running too low, stop the metoprolol altogether and just take the amlodipine for BP control. Then use the metoprolol as needed if BP spikes during illness or otherwise. Aim for -130/70-80's.     Return in about 6 months (around 10/17/2024).    Subjective:      Patient ID: Rita Cardona is a 53 y.o. female.    Chief Complaint   Patient presents with    Follow-up       Here for BP f/u. BP running 110/60's at times. Pt was given metoprolol after COVID aggravated her BP. Pt notices her BP does increase with illness, but COVID really spiked the BP. Feeling woozy, lightheaded. 120/70's. Noticing allergies, using xyzal and nasacort. Has been on her inhaler since late February., after COVID, variable use depending on the weather. Very tired, wants to go to sleep in mid afternoon. Head feeling heavy. Doing well on qvar, intolerant to pulmicort. Cannot sleep, hyper.         The following portions of the patient's history were reviewed and updated as appropriate: allergies, current medications, past family history, past medical history, past social history, past surgical history and problem list.    Review of Systems   Constitutional:  Positive for fatigue. Negative for fever.   HENT:  Positive for congestion.    Eyes:  Negative for visual disturbance.   Respiratory:  Positive for cough. Negative for chest tightness and shortness of breath.    Cardiovascular:  Negative for chest pain and palpitations.   Gastrointestinal:  Positive for nausea. Negative for abdominal  pain.   Genitourinary:  Negative for difficulty urinating.   Musculoskeletal:  Negative for arthralgias.   Neurological:  Positive for light-headedness. Negative for headaches.   Hematological:  Does not bruise/bleed easily.         Current Outpatient Medications   Medication Sig Dispense Refill    albuterol (ProAir HFA) 90 mcg/act inhaler Inhale 2 puffs every 4 (four) hours as needed for wheezing 18 g 1    amLODIPine (NORVASC) 10 mg tablet Take 1 tablet (10 mg total) by mouth daily 90 tablet 3    b complex vitamins capsule Take 1 capsule by mouth daily      beclomethasone (Qvar RediHaler) 80 MCG/ACT inhaler Inhale 2 puffs 2 (two) times a day Rinse mouth after use. 10.6 g 1    Levocetirizine Dihydrochloride (XYZAL PO) Take by mouth      metoprolol succinate (TOPROL-XL) 50 mg 24 hr tablet Take 0.5 tablets (25 mg total) by mouth daily      Multiple Vitamin-Folic Acid TABS Take by mouth      Triamcinolone Acetonide (NASACORT AQ NA) into each nostril       No current facility-administered medications for this visit.       Objective:    /76 (BP Location: Right arm, Patient Position: Sitting, Cuff Size: Large)   Pulse 56   Temp (!) 97.2 °F (36.2 °C) (Temporal)   Wt 113 kg (250 lb)   SpO2 97%   BMI 42.91 kg/m²        Physical Exam  Vitals reviewed.   Constitutional:       Appearance: Normal appearance. She is well-developed.   HENT:      Head:      Comments: No sinus tenderness     Right Ear: Tympanic membrane, ear canal and external ear normal.      Left Ear: Tympanic membrane, ear canal and external ear normal.   Cardiovascular:      Rate and Rhythm: Normal rate and regular rhythm.      Heart sounds: Normal heart sounds.   Pulmonary:      Effort: Pulmonary effort is normal.      Breath sounds: Normal breath sounds.   Skin:     General: Skin is warm.   Neurological:      General: No focal deficit present.      Mental Status: She is alert and oriented to person, place, and time.   Psychiatric:         Mood and  Affect: Mood normal.         Behavior: Behavior normal.         Thought Content: Thought content normal.         Judgment: Judgment normal.                Coby Soto MD

## 2024-04-22 DIAGNOSIS — R03.0 ELEVATED BLOOD PRESSURE READING: ICD-10-CM

## 2024-04-22 RX ORDER — AMLODIPINE BESYLATE 10 MG/1
10 TABLET ORAL DAILY
Qty: 90 TABLET | Refills: 1 | Status: SHIPPED | OUTPATIENT
Start: 2024-04-22

## 2024-05-08 ENCOUNTER — HOSPITAL ENCOUNTER (OUTPATIENT)
Dept: RADIOLOGY | Age: 54
Discharge: HOME | End: 2024-05-08
Payer: COMMERCIAL

## 2024-05-08 VITALS — WEIGHT: 250 LBS | HEIGHT: 64 IN | BODY MASS INDEX: 42.68 KG/M2

## 2024-05-08 DIAGNOSIS — Z12.31 VISIT FOR SCREENING MAMMOGRAM: ICD-10-CM

## 2024-05-08 PROCEDURE — 77067 SCR MAMMO BI INCL CAD: CPT

## 2024-05-08 PROCEDURE — 77063 BREAST TOMOSYNTHESIS BI: CPT

## 2024-10-02 DIAGNOSIS — J20.9 BRONCHITIS WITH BRONCHOSPASM: ICD-10-CM

## 2024-10-02 RX ORDER — BECLOMETHASONE DIPROPIONATE HFA 80 UG/1
AEROSOL, METERED RESPIRATORY (INHALATION)
Qty: 10.6 G | Refills: 1 | Status: SHIPPED | OUTPATIENT
Start: 2024-10-02

## 2024-10-17 ENCOUNTER — OFFICE VISIT (OUTPATIENT)
Dept: FAMILY MEDICINE CLINIC | Facility: CLINIC | Age: 54
End: 2024-10-17
Payer: COMMERCIAL

## 2024-10-17 VITALS
HEIGHT: 64 IN | OXYGEN SATURATION: 98 % | SYSTOLIC BLOOD PRESSURE: 138 MMHG | WEIGHT: 255.2 LBS | DIASTOLIC BLOOD PRESSURE: 82 MMHG | BODY MASS INDEX: 43.57 KG/M2 | TEMPERATURE: 97.9 F | HEART RATE: 71 BPM

## 2024-10-17 DIAGNOSIS — J20.9 BRONCHITIS WITH BRONCHOSPASM: ICD-10-CM

## 2024-10-17 DIAGNOSIS — J45.30 MILD PERSISTENT ASTHMA WITHOUT COMPLICATION: Primary | ICD-10-CM

## 2024-10-17 DIAGNOSIS — I10 PRIMARY HYPERTENSION: ICD-10-CM

## 2024-10-17 DIAGNOSIS — R03.0 ELEVATED BLOOD PRESSURE READING: ICD-10-CM

## 2024-10-17 DIAGNOSIS — J30.1 SEASONAL ALLERGIC RHINITIS DUE TO POLLEN: ICD-10-CM

## 2024-10-17 DIAGNOSIS — T50.B95A ADVERSE EFFECT OF MRNA COVID-19 VACCINE: ICD-10-CM

## 2024-10-17 PROCEDURE — 99214 OFFICE O/P EST MOD 30 MIN: CPT | Performed by: FAMILY MEDICINE

## 2024-10-17 RX ORDER — AMLODIPINE BESYLATE 10 MG/1
10 TABLET ORAL DAILY
Qty: 90 TABLET | Refills: 1 | Status: SHIPPED | OUTPATIENT
Start: 2024-10-17

## 2024-10-17 RX ORDER — BECLOMETHASONE DIPROPIONATE HFA 80 UG/1
2 AEROSOL, METERED RESPIRATORY (INHALATION) DAILY
Start: 2024-10-17

## 2024-10-17 NOTE — PROGRESS NOTES
"Ambulatory Visit  Name: Rita Cardona      : 1970      MRN: [id number]  Encounter Provider: Coby Soto MD  Encounter Date: 10/17/2024   Encounter department: [facility]    Assessment & Plan  Bronchitis with bronchospasm    Orders:    beclomethasone (Qvar RediHaler) 80 MCG/ACT inhaler; Inhale 2 puffs in the morning Rinse mouth after use.    Mild persistent asthma without complication         Adverse effect of mRNA COVID-19 vaccine         Seasonal allergic rhinitis due to pollen         Primary hypertension  Keep watching BP, continue current meds, check labs before next visit          History of Present Illness     Here for f/u. Got her COVID shot last week, since then her BP has been elevated. 150/80's. Usually runs 120/70-80's. Pt notes in the past the COVID infection has also made her BP go up. She got the shot ahead of a trip to [place] next week. Watching her BP since then. Elev BP can be a prodrome to COVID infection. Struggles with allergy induced asthma. Uses xyzal and nasacort, using the qvar at night bc her overnight symptoms are worse karina this time of year. Hasn't needed the albuterol.           Review of Systems   Constitutional:  Negative for fatigue and fever.   HENT:  Negative for congestion.    Eyes:  Negative for visual disturbance.   Respiratory:  Negative for chest tightness and shortness of breath.    Cardiovascular:  Negative for chest pain and palpitations.   Gastrointestinal:  Negative for abdominal pain.   Genitourinary:  Negative for difficulty urinating.   Musculoskeletal:  Negative for arthralgias.   Neurological:  Negative for headaches.   Hematological:  Does not bruise/bleed easily.           Objective     /82 (BP Location: Left arm, Patient Position: Sitting, Cuff Size: Large)   Pulse 71   Temp 97.9 °F (36.6 °C) (Temporal)   Ht 5' 4\" (1.626 m)   Wt 116 kg (255 lb 3.2 oz)   SpO2 98%   BMI 43.80 kg/m²     Physical Exam  Vitals " reviewed.   Constitutional:       Appearance: Normal appearance. She is well-developed.   Cardiovascular:      Rate and Rhythm: Normal rate and regular rhythm.      Heart sounds: Normal heart sounds.   Pulmonary:      Effort: Pulmonary effort is normal.      Breath sounds: Normal breath sounds.   Skin:     General: Skin is warm.   Neurological:      General: No focal deficit present.      Mental Status: She is alert and oriented to person, place, and time.   Psychiatric:         Mood and Affect: Mood normal.         Behavior: Behavior normal.         Thought Content: Thought content normal.         Judgment: Judgment normal.

## 2024-11-02 ENCOUNTER — OFFICE VISIT (OUTPATIENT)
Dept: URGENT CARE | Facility: MEDICAL CENTER | Age: 54
End: 2024-11-02
Payer: COMMERCIAL

## 2024-11-02 VITALS
BODY MASS INDEX: 43.54 KG/M2 | HEIGHT: 64 IN | RESPIRATION RATE: 20 BRPM | DIASTOLIC BLOOD PRESSURE: 82 MMHG | SYSTOLIC BLOOD PRESSURE: 142 MMHG | OXYGEN SATURATION: 99 % | WEIGHT: 255 LBS | HEART RATE: 71 BPM | TEMPERATURE: 98.8 F

## 2024-11-02 DIAGNOSIS — J45.901 MILD ASTHMA WITH ACUTE EXACERBATION, UNSPECIFIED WHETHER PERSISTENT: Primary | ICD-10-CM

## 2024-11-02 PROCEDURE — G0383 LEV 4 HOSP TYPE B ED VISIT: HCPCS | Performed by: PHYSICIAN ASSISTANT

## 2024-11-02 PROCEDURE — S9083 URGENT CARE CENTER GLOBAL: HCPCS | Performed by: PHYSICIAN ASSISTANT

## 2024-11-02 RX ORDER — IPRATROPIUM BROMIDE AND ALBUTEROL SULFATE 2.5; .5 MG/3ML; MG/3ML
3 SOLUTION RESPIRATORY (INHALATION) ONCE
Status: COMPLETED | OUTPATIENT
Start: 2024-11-02 | End: 2024-11-02

## 2024-11-02 RX ORDER — METHYLPREDNISOLONE 4 MG/1
TABLET ORAL
Qty: 1 EACH | Refills: 0 | Status: SHIPPED | OUTPATIENT
Start: 2024-11-02

## 2024-11-02 RX ORDER — IPRATROPIUM BROMIDE AND ALBUTEROL SULFATE 2.5; .5 MG/3ML; MG/3ML
3 SOLUTION RESPIRATORY (INHALATION) EVERY 6 HOURS PRN
Qty: 90 ML | Refills: 0 | Status: SHIPPED | OUTPATIENT
Start: 2024-11-02

## 2024-11-02 RX ADMIN — IPRATROPIUM BROMIDE AND ALBUTEROL SULFATE 3 ML: 2.5; .5 SOLUTION RESPIRATORY (INHALATION) at 18:00

## 2024-11-02 NOTE — PROGRESS NOTES
[facility]        NAME: Rita Cardona is a 54 y.o. female  : 1970    MRN: [id number]  DATE: 2024  TIME: 7:16 PM    Assessment and Plan   Mild asthma with acute exacerbation, unspecified whether persistent [J45.901]  1. Mild asthma with acute exacerbation, unspecified whether persistent  ipratropium-albuterol (DUO-NEB) 0.5-2.5 mg/3 mL inhalation solution 3 mL    ipratropium-albuterol (DUO-NEB) 0.5-2.5 mg/3 mL nebulizer solution    methylPREDNISolone 4 MG tablet therapy pack    Nebulizers (Compressor Nebulizer) MISC          Patient with significant proved and after nebulizer treatment today    The patient and/or parent/legal guardian verbalized understanding of exam findings and   Treatment plan. We engaged in the shared decision-making process and treatment options were   discussed at length with the patient.  All questions, concerns and complaints were answered and   addressed to the patient's' and/or parent/legal guardians's satisfaction.    Patient Instructions   There are no Patient Instructions on file for this visit.    Follow up with PCP in 3-5 days.  Proceed to  ER if symptoms worsen.    If tests are performed, our office will contact you with results only if   changes need to made to the care plan discussed with you at the visit.   You can review your full results on [facility].     Chief Complaint     Chief Complaint   Patient presents with    Shortness of Breath     Post nasal drip, chest congestion, unable to take a deep breath, SOB with exertion; using qvair and albuterol with no relief; states she believes she is experiencing an asthma exacerbation; COVID tested at home which was negative    Asthma    Cough         History of Present Illness       HPI  Patient present complaining of postnasal drip chest congestion and inability to take deep breath shortness of breath with exertion she does have history of asthma.  She takes daily inhaler as well as rescue  inhaler.  Does not have a nebulizer at home.  Tested negative today for COVID at home.  She reports minimal relief with her rescue inhaler.  Denies any fevers or chills.    Review of Systems   Review of Systems  All other related systems reviewed and are negative except as noted in HPI    Current Medications       Current Outpatient Medications:     ipratropium-albuterol (DUO-NEB) 0.5-2.5 mg/3 mL nebulizer solution, Take 3 mL by nebulization every 6 (six) hours as needed for wheezing or shortness of breath, Disp: 90 mL, Rfl: 0    methylPREDNISolone 4 MG tablet therapy pack, Use as directed on package, Disp: 1 each, Rfl: 0    Nebulizers (Compressor Nebulizer) MISC, Use every 6 (six) hours as needed (wheezing or SOB), Disp: 1 each, Rfl: 0    albuterol (ProAir HFA) 90 mcg/act inhaler, Inhale 2 puffs every 4 (four) hours as needed for wheezing, Disp: 18 g, Rfl: 1    amLODIPine (NORVASC) 10 mg tablet, Take 1 tablet (10 mg total) by mouth daily, Disp: 90 tablet, Rfl: 1    b complex vitamins capsule, Take 1 capsule by mouth daily, Disp: , Rfl:     beclomethasone (Qvar RediHaler) 80 MCG/ACT inhaler, Inhale 2 puffs in the morning Rinse mouth after use., Disp: , Rfl:     Levocetirizine Dihydrochloride (XYZAL PO), Take by mouth, Disp: , Rfl:     Multiple Vitamin-Folic Acid TABS, Take by mouth, Disp: , Rfl:     Triamcinolone Acetonide (NASACORT AQ NA), into each nostril, Disp: , Rfl:   No current facility-administered medications for this visit.    Current Allergies     Allergies as of 11/02/2024 - Reviewed 11/02/2024   Allergen Reaction Noted    Erythromycin GI Intolerance 03/17/2014    Avocado (diagnostic) - food allergy Vomiting 10/17/2024    Pulmicort [budesonide] Irritability and Hyperactivity 04/17/2024    Shellfish-derived products - food allergy Vomiting 10/17/2024    Penicillins GI Intolerance 03/17/2014            The following portions of the patient's history were reviewed and updated as appropriate: allergies,  "current medications, past family history, past medical history, past social history, past surgical history and problem list.     Past Medical History:   Diagnosis Date    Allergic birth    Arthritis 5/17/2021    Asthma Birth    Hypertension 11/2022    Due to covid    Pulmonary disease        Past Surgical History:   Procedure Laterality Date    BLADDER SURGERY      HAND RECONSTRUCTION Bilateral 5/2019-11/2019       Family History   Problem Relation Age of Onset    No Known Problems Mother     No Known Problems Father     No Known Problems Daughter     No Known Problems Maternal Grandmother     No Known Problems Maternal Grandfather     Breast cancer Paternal Grandmother 80    No Known Problems Paternal Grandfather     No Known Problems Son     Lung cancer Maternal Uncle     No Known Problems Paternal Aunt     Diabetes Family          Medications have been verified.        Objective   /82 Comment: manual bp  Pulse 71   Temp 98.8 °F (37.1 °C) (Temporal)   Resp 20   Ht 5' 4\" (1.626 m)   Wt 116 kg (255 lb)   SpO2 99%   BMI 43.77 kg/m²   No LMP recorded. Patient is perimenopausal.       Physical Exam     Physical Exam  Constitutional:       General: She is not in acute distress.     Appearance: She is well-developed. She is not diaphoretic.   HENT:      Head: Normocephalic and atraumatic.   Eyes:      General: No scleral icterus.     Conjunctiva/sclera: Conjunctivae normal.   Neck:      Trachea: No tracheal deviation.   Cardiovascular:      Rate and Rhythm: Normal rate and regular rhythm.      Heart sounds: Normal heart sounds. No murmur heard.  Pulmonary:      Effort: Pulmonary effort is normal. No respiratory distress.      Breath sounds: No stridor. Examination of the right-lower field reveals decreased breath sounds and wheezing. Examination of the left-lower field reveals decreased breath sounds and wheezing. Decreased breath sounds and wheezing present. No rhonchi or rales.   Musculoskeletal:      " "Cervical back: Normal range of motion and neck supple.   Lymphadenopathy:      Cervical: No cervical adenopathy.   Skin:     General: Skin is warm and dry.      Findings: No erythema.   Neurological:      Mental Status: She is alert and oriented to person, place, and time.   Psychiatric:         Behavior: Behavior normal.         Ortho Exam        Procedures          Note: Portions of this record may have been created with voice recognition software. Occasional wrong word or \"sound a like\" substitutions may have occurred due to the inherent limitations of voice recognition software. Please read the chart carefully and recognize, using context, where substitutions have occurred.*      "

## 2024-11-04 ENCOUNTER — DOCUMENTATION (OUTPATIENT)
Dept: ADMINISTRATIVE | Facility: OTHER | Age: 54
End: 2024-11-04

## 2024-11-04 DIAGNOSIS — J01.00 ACUTE NON-RECURRENT MAXILLARY SINUSITIS: Primary | ICD-10-CM

## 2024-11-04 RX ORDER — AZITHROMYCIN 250 MG/1
TABLET, FILM COATED ORAL
Qty: 6 TABLET | Refills: 0 | Status: SHIPPED | OUTPATIENT
Start: 2024-11-04 | End: 2024-11-09

## 2024-11-04 NOTE — PROGRESS NOTES
Mariusz Sinclair, RN  P Patient Reported Team         Blood pressure elevated  Appointment department: [facility]  Appointment provider: Arnol Vargas PA-C  Blood pressure  11/02/24 1759 142/82  11/02/24 1725 140/76    11/04/24 10:45 AM    Patient was called after the Urgent Care visit ; there was no answer/ a message could not be left.  Call cannot be completed at this time. Please try you call again later.  Thank you.  Ahmet Cloud MA  [facility]

## 2024-11-08 DIAGNOSIS — J45.901 MILD ASTHMA WITH ACUTE EXACERBATION, UNSPECIFIED WHETHER PERSISTENT: ICD-10-CM

## 2024-11-13 RX ORDER — IPRATROPIUM BROMIDE AND ALBUTEROL SULFATE 2.5; .5 MG/3ML; MG/3ML
3 SOLUTION RESPIRATORY (INHALATION) EVERY 6 HOURS PRN
Qty: 90 ML | Refills: 0 | Status: SHIPPED | OUTPATIENT
Start: 2024-11-13 | End: 2024-11-24

## 2024-11-13 NOTE — TELEPHONE ENCOUNTER
Patient called the RX Refill Line. Message is being forwarded to the office.     Patient sent [employer] message and called.  She wants to know if the dr wants to keep her on the current dose of the nebulizer or change it.  She is still coughing and gets short of breath.  She is using the nebulizer solution every 6 hours and she only has 2 vial left.      Please contact patient at [phone]

## 2024-11-14 ENCOUNTER — OFFICE VISIT (OUTPATIENT)
Dept: FAMILY MEDICINE CLINIC | Facility: CLINIC | Age: 54
End: 2024-11-14
Payer: COMMERCIAL

## 2024-11-14 VITALS
DIASTOLIC BLOOD PRESSURE: 78 MMHG | HEIGHT: 64 IN | TEMPERATURE: 98 F | RESPIRATION RATE: 20 BRPM | OXYGEN SATURATION: 98 % | HEART RATE: 70 BPM | BODY MASS INDEX: 43.57 KG/M2 | SYSTOLIC BLOOD PRESSURE: 150 MMHG | WEIGHT: 255.2 LBS

## 2024-11-14 DIAGNOSIS — Z12.11 COLON CANCER SCREENING: ICD-10-CM

## 2024-11-14 DIAGNOSIS — J45.30 MILD PERSISTENT ASTHMA WITHOUT COMPLICATION: Primary | ICD-10-CM

## 2024-11-14 DIAGNOSIS — J20.9 BRONCHITIS WITH BRONCHOSPASM: ICD-10-CM

## 2024-11-14 PROCEDURE — 99213 OFFICE O/P EST LOW 20 MIN: CPT | Performed by: FAMILY MEDICINE

## 2024-11-14 RX ORDER — PREDNISONE 10 MG/1
TABLET ORAL
Qty: 21 TABLET | Refills: 1 | Status: SHIPPED | OUTPATIENT
Start: 2024-11-14

## 2024-11-14 RX ORDER — BUDESONIDE 0.5 MG/2ML
0.5 INHALANT ORAL 2 TIMES DAILY
Qty: 120 ML | Refills: 1 | Status: SHIPPED | OUTPATIENT
Start: 2024-11-14

## 2024-11-14 NOTE — PROGRESS NOTES
Name: Rita Cardona      : 1970      MRN: [id number]  Encounter Provider: Coby Soto MD  Encounter Date: 2024   Encounter department: [facility]  :  Assessment & Plan  Mild persistent asthma without complication    Orders:    predniSONE 10 mg tablet; 30mg PO x 3d, 20mg PO x 3d, 10mg PO x 3d, 5mg PO x 4d    Bronchitis with bronchospasm    Orders:    predniSONE 10 mg tablet; 30mg PO x 3d, 20mg PO x 3d, 10mg PO x 3d, 5mg PO x 4d    budesonide (Pulmicort) 0.5 mg/2 mL nebulizer solution; Take 2 mL (0.5 mg total) by nebulization 2 (two) times a day Rinse mouth after use.    Colon cancer screening    Orders:    [facility]          Depression Screening and Follow-up Plan: Patient was screened for depression during today's encounter. They screened negative with a PHQ-2 score of 0.      History of Present Illness     Here for 2 weeks of coughing spasms despite using her albuterol inhaler 4x a day and qvar 80mcg bid. Inhalers weren't helping. Got breathing treatment at Urgent care, sent her home with a nebulizer machine. Got medrol dose kai on  and zpak . Symptoms returned shortly after finishing the steroids. Using nebs tid-qid schedule permitting. Maybe can't breathe deep enough for the inhalers to be effective.     Cough  Pertinent negatives include no chest pain, fever, headaches or shortness of breath.     Review of Systems   Constitutional:  Negative for fatigue and fever.   HENT:  Negative for congestion.    Eyes:  Negative for visual disturbance.   Respiratory:  Positive for cough. Negative for chest tightness and shortness of breath.    Cardiovascular:  Negative for chest pain and palpitations.   Gastrointestinal:  Negative for abdominal pain.   Genitourinary:  Negative for difficulty urinating.   Musculoskeletal:  Negative for arthralgias.   Neurological:  Negative for headaches.   Hematological:  Does not bruise/bleed easily.          Objective   /78 (BP  "Location: Left arm, Patient Position: Sitting, Cuff Size: Standard)   Pulse 70   Temp 98 °F (36.7 °C) (Temporal)   Resp 20   Ht 5' 4\" (1.626 m)   Wt 116 kg (255 lb 3.2 oz)   SpO2 98%   BMI 43.80 kg/m²      Physical Exam  Vitals reviewed.   Constitutional:       Appearance: Normal appearance. She is well-developed.   Cardiovascular:      Rate and Rhythm: Normal rate and regular rhythm.      Heart sounds: Normal heart sounds.   Pulmonary:      Effort: Pulmonary effort is normal.      Breath sounds: Normal breath sounds.      Comments: Coughing spasms with deep breath  Skin:     General: Skin is warm.   Neurological:      General: No focal deficit present.      Mental Status: She is alert and oriented to person, place, and time.   Psychiatric:         Mood and Affect: Mood normal.         Behavior: Behavior normal.         Thought Content: Thought content normal.         Judgment: Judgment normal.         "

## 2024-11-24 DIAGNOSIS — J20.9 BRONCHITIS WITH BRONCHOSPASM: ICD-10-CM

## 2024-11-24 DIAGNOSIS — J45.30 MILD PERSISTENT ASTHMA WITHOUT COMPLICATION: Primary | ICD-10-CM

## 2024-11-24 RX ORDER — ALBUTEROL SULFATE 90 UG/1
2 INHALANT RESPIRATORY (INHALATION) EVERY 6 HOURS PRN
Qty: 18 G | Refills: 1 | Status: SHIPPED | OUTPATIENT
Start: 2024-11-24

## 2024-11-27 ENCOUNTER — TELEPHONE (OUTPATIENT)
Age: 54
End: 2024-11-27

## 2024-11-27 NOTE — TELEPHONE ENCOUNTER
Pt cough and post nasal drip is back.  Asking for refill on tessalon perles.  She took last one she had last night and it helped.  Patient is leaving for [place] on Friday.  [facility]

## 2024-12-26 ENCOUNTER — OFFICE VISIT (OUTPATIENT)
Dept: FAMILY MEDICINE CLINIC | Facility: CLINIC | Age: 54
End: 2024-12-26
Payer: COMMERCIAL

## 2024-12-26 VITALS
DIASTOLIC BLOOD PRESSURE: 80 MMHG | TEMPERATURE: 97.3 F | SYSTOLIC BLOOD PRESSURE: 130 MMHG | WEIGHT: 258 LBS | HEART RATE: 67 BPM | OXYGEN SATURATION: 100 % | HEIGHT: 64 IN | BODY MASS INDEX: 44.05 KG/M2

## 2024-12-26 DIAGNOSIS — J32.0 LEFT MAXILLARY SINUSITIS: Primary | ICD-10-CM

## 2024-12-26 DIAGNOSIS — J45.30 MILD PERSISTENT ASTHMA WITHOUT COMPLICATION: ICD-10-CM

## 2024-12-26 DIAGNOSIS — J20.9 BRONCHITIS WITH BRONCHOSPASM: ICD-10-CM

## 2024-12-26 PROCEDURE — 99213 OFFICE O/P EST LOW 20 MIN: CPT | Performed by: FAMILY MEDICINE

## 2024-12-26 RX ORDER — DOXYCYCLINE HYCLATE 100 MG
100 TABLET ORAL 2 TIMES DAILY
Qty: 20 TABLET | Refills: 0 | Status: SHIPPED | OUTPATIENT
Start: 2024-12-26 | End: 2025-01-05

## 2024-12-26 RX ORDER — BECLOMETHASONE DIPROPIONATE HFA 80 UG/1
2 AEROSOL, METERED RESPIRATORY (INHALATION) DAILY
Qty: 10.6 G | Refills: 2 | Status: SHIPPED | OUTPATIENT
Start: 2024-12-26

## 2024-12-26 RX ORDER — PREDNISONE 10 MG/1
TABLET ORAL
Qty: 21 TABLET | Refills: 1 | Status: SHIPPED | OUTPATIENT
Start: 2024-12-26

## 2024-12-26 RX ORDER — ALBUTEROL SULFATE 0.83 MG/ML
2.5 SOLUTION RESPIRATORY (INHALATION) EVERY 6 HOURS PRN
Qty: 75 ML | Refills: 1 | Status: SHIPPED | OUTPATIENT
Start: 2024-12-26

## 2024-12-26 NOTE — PROGRESS NOTES
Name: Rita Cardona      : 1970      MRN: [id number]  Encounter Provider: Coby Soto MD  Encounter Date: 2024   Encounter department: [facility]  :  Assessment & Plan  Bronchitis with bronchospasm    Orders:    beclomethasone (Qvar RediHaler) 80 MCG/ACT inhaler; Inhale 2 puffs in the morning Rinse mouth after use.    albuterol (2.5 mg/3 mL) 0.083 % nebulizer solution; Take 3 mL (2.5 mg total) by nebulization every 6 (six) hours as needed for wheezing or shortness of breath    Albuterol Sulfate 108 (90 Base) MCG/ACT AEPB; Inhale 2 puffs every 4 (four) hours as needed (wheezing, coughing, SOB)    predniSONE 10 mg tablet; 30mg PO x 3d, 20mg PO x 3d, 10mg PO x 3d, 5mg PO x 4d    Mild persistent asthma without complication    Orders:    albuterol (2.5 mg/3 mL) 0.083 % nebulizer solution; Take 3 mL (2.5 mg total) by nebulization every 6 (six) hours as needed for wheezing or shortness of breath    Albuterol Sulfate 108 (90 Base) MCG/ACT AEPB; Inhale 2 puffs every 4 (four) hours as needed (wheezing, coughing, SOB)    predniSONE 10 mg tablet; 30mg PO x 3d, 20mg PO x 3d, 10mg PO x 3d, 5mg PO x 4d    Left maxillary sinusitis    Orders:    doxycycline hyclate (VIBRA-TABS) 100 mg tablet; Take 1 tablet (100 mg total) by mouth 2 (two) times a day for 10 days           History of Present Illness     Here for head and chest congestion, was away in [place] from - but other than ear pressure on the plane she was doing well. 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 Hasn't been on abx since early . No fevers. L sided face pain, pain behind the eyes and in L ear. Has been using the nebulizer to see if it would be more effective than the inhaler. Using navage and humidifier    Cough  Associated symptoms include ear pain, headaches, rhinorrhea and a sore throat. Pertinent negatives include no chest pain, fever or shortness of breath.   Earache   There is pain in the left ear. This  "is a recurrent problem. The current episode started 1 to 4 weeks ago. The problem has been waxing and waning. There has been no fever. The fever has been present for 5 days or more. The pain is at a severity of 3/10. Associated symptoms include coughing, headaches, rhinorrhea and a sore throat. Pertinent negatives include no abdominal pain.   Headache    Review of Systems   Constitutional:  Negative for fatigue and fever.   HENT:  Positive for congestion, ear pain, rhinorrhea and sore throat.    Eyes:  Negative for visual disturbance.   Respiratory:  Positive for cough. Negative for chest tightness and shortness of breath.    Cardiovascular:  Negative for chest pain and palpitations.   Gastrointestinal:  Negative for abdominal pain.   Genitourinary:  Negative for difficulty urinating.   Musculoskeletal:  Negative for arthralgias.   Neurological:  Positive for headaches.   Hematological:  Does not bruise/bleed easily.       Objective   /80 (BP Location: Left arm, Patient Position: Sitting, Cuff Size: Large)   Pulse 67   Temp (!) 97.3 °F (36.3 °C) (Temporal)   Ht 5' 4\" (1.626 m)   Wt 117 kg (258 lb)   SpO2 100%   BMI 44.29 kg/m²      Physical Exam  Vitals reviewed.   Constitutional:       Appearance: Normal appearance. She is well-developed.   HENT:      Head:      Comments: Tender over L frontal and max sinus     Right Ear: Ear canal and external ear normal.      Left Ear: Ear canal and external ear normal.      Ears:      Comments: R ear bulging TM     Nose: Nose normal.      Mouth/Throat:      Mouth: Mucous membranes are moist.      Pharynx: Oropharynx is clear.   Cardiovascular:      Rate and Rhythm: Normal rate and regular rhythm.      Heart sounds: Normal heart sounds.   Pulmonary:      Effort: Pulmonary effort is normal.      Breath sounds: Normal breath sounds.   Lymphadenopathy:      Cervical: No cervical adenopathy.   Skin:     General: Skin is warm.   Neurological:      General: No focal deficit " present.      Mental Status: She is alert and oriented to person, place, and time.   Psychiatric:         Mood and Affect: Mood normal.         Behavior: Behavior normal.         Thought Content: Thought content normal.         Judgment: Judgment normal.

## 2025-01-07 ENCOUNTER — ANNUAL EXAM (OUTPATIENT)
Dept: OBGYN CLINIC | Facility: MEDICAL CENTER | Age: 55
End: 2025-01-07
Payer: COMMERCIAL

## 2025-01-07 VITALS — BODY MASS INDEX: 44.97 KG/M2 | DIASTOLIC BLOOD PRESSURE: 86 MMHG | WEIGHT: 262 LBS | SYSTOLIC BLOOD PRESSURE: 148 MMHG

## 2025-01-07 DIAGNOSIS — N81.6 CYSTOCELE WITH RECTOCELE: ICD-10-CM

## 2025-01-07 DIAGNOSIS — Z01.419 ROUTINE GYNECOLOGICAL EXAMINATION: Primary | ICD-10-CM

## 2025-01-07 DIAGNOSIS — N39.46 MIXED INCONTINENCE: ICD-10-CM

## 2025-01-07 DIAGNOSIS — N81.10 CYSTOCELE WITH RECTOCELE: ICD-10-CM

## 2025-01-07 DIAGNOSIS — B37.31 YEAST VAGINITIS: ICD-10-CM

## 2025-01-07 PROCEDURE — S0612 ANNUAL GYNECOLOGICAL EXAMINA: HCPCS | Performed by: PHYSICIAN ASSISTANT

## 2025-01-07 RX ORDER — FLUCONAZOLE 150 MG/1
TABLET ORAL
Qty: 2 TABLET | Refills: 0 | Status: SHIPPED | OUTPATIENT
Start: 2025-01-07 | End: 2025-01-11

## 2025-01-07 NOTE — PROGRESS NOTES
Assessment   54 y.o.  presenting for annual exam.     Plan   Diagnoses and all orders for this visit:    Routine gynecological examination    Normal findings on routine exam.  Encouraged 150 min of exercise per week.  Reviewed balanced diet.   Multivitamin encouraged   Breast awareness/SBE encouraged     Yeast vaginitis  -     fluconazole (DIFLUCAN) 150 mg tablet; Take one tablet now, then one tablet in 3 days.    Diflucan sent to pharmacy on file. Use reviewed.  RTO in 1 week if sx persist, sooner as needed.     Mixed incontinence  -     Ambulatory Referral to Urogynecology; Future    Reviewed common causes. Reviewed benefit of weight reduction, pelvic floor pt, and surgical intervention. Patient is interested in referral, which was provided     Cystocele with rectocele  -     Ambulatory Referral to Urogynecology; Future    Reviewed common causes. Reviewed benefit of weight reduction, pelvic floor pt, and surgical intervention. Patient is interested in referral, which was provided        Pap - due    Mammo scheduled    Colonoscopy due - has Cologuard order       RTO one year for yearly exam or sooner as needed.    __________________________________________________________________    Subjective     Ritayancy Cardona is a 54 y.o.  presenting for annual exam.     Still teaching in inclusion classroom    SCREENING  Last Pap: 2021 - NILM/hpv neg   Last Mammo: 2024 BIRADS 1 - Negative, LTC 27.37, heterogeneously dense   Last Colonoscopy: due      GYN    Periods are light and rare. Last was 2024. Prior to that was 2024. Not heavy or painful.     + VM sx , primarily night sweats. Not intrusive.     Sexually active: with her    Concerns: denies pain, bleeding, dryness     We reviewed ASCCP guidelines for Pap testing today.    + vulvovaginal itching since day 2-3 of doxycyline. Feels like prior yeast infections.   Denies vaginal discharge, odor, dyspareunia, pelvic pain  and vulvar/vaginal symptoms    OB         Complaints: + stress and urge leakage.   Denies frequency, hematuria, change in stream, difficulty urinating.       BREAST  Complaints: denies   Denies: breast lump, breast tenderness, nipple discharge, skin color change, and skin lesion(s)    Pertinent Family Hx:   Family hx of breast cancer: PGM (80)   Family hx of ovarian cancer: no  Family hx of colon cancer: no      GENERAL  PMH reviewed/updated and is as below.     Past Medical History:   Diagnosis Date    Allergic birth    Arthritis 2021    Asthma Birth    Hypertension 2022    Due to covid    Pulmonary disease        Past Surgical History:   Procedure Laterality Date    BLADDER SURGERY      HAND RECONSTRUCTION Bilateral 2019-2019         Current Outpatient Medications:     albuterol (2.5 mg/3 mL) 0.083 % nebulizer solution, Take 3 mL (2.5 mg total) by nebulization every 6 (six) hours as needed for wheezing or shortness of breath, Disp: 75 mL, Rfl: 1    Albuterol Sulfate 108 (90 Base) MCG/ACT AEPB, Inhale 2 puffs every 4 (four) hours as needed (wheezing, coughing, SOB), Disp: 1 each, Rfl: 2    amLODIPine (NORVASC) 10 mg tablet, Take 1 tablet (10 mg total) by mouth daily, Disp: 90 tablet, Rfl: 1    b complex vitamins capsule, Take 1 capsule by mouth daily, Disp: , Rfl:     beclomethasone (Qvar RediHaler) 80 MCG/ACT inhaler, Inhale 2 puffs in the morning Rinse mouth after use., Disp: 10.6 g, Rfl: 2    budesonide (Pulmicort) 0.5 mg/2 mL nebulizer solution, Take 2 mL (0.5 mg total) by nebulization 2 (two) times a day Rinse mouth after use., Disp: 120 mL, Rfl: 1    fluconazole (DIFLUCAN) 150 mg tablet, Take one tablet now, then one tablet in 3 days., Disp: 2 tablet, Rfl: 0    Levocetirizine Dihydrochloride (XYZAL PO), Take by mouth, Disp: , Rfl:     Nebulizers (Compressor Nebulizer) MISC, Use every 6 (six) hours as needed (wheezing or SOB), Disp: 1 each, Rfl: 0    predniSONE 10 mg tablet, 30mg PO x 3d,  20mg PO x 3d, 10mg PO x 3d, 5mg PO x 4d, Disp: 21 tablet, Rfl: 1    Triamcinolone Acetonide (NASACORT AQ NA), into each nostril, Disp: , Rfl:     Multiple Vitamin-Folic Acid TABS, Take by mouth (Patient not taking: Reported on 12/26/2024), Disp: , Rfl:     Allergies   Allergen Reactions    Erythromycin GI Intolerance    Avocado (Diagnostic) - Food Allergy Vomiting    Pulmicort [Budesonide] Irritability and Hyperactivity    Shellfish-Derived Products - Food Allergy Vomiting    Penicillins GI Intolerance     Stomach upset, needs to eat meal before taking med        Social History     Socioeconomic History    Marital status: /Civil Union     Spouse name: Not on file    Number of children: Not on file    Years of education: Not on file    Highest education level: Not on file   Occupational History    Not on file   Tobacco Use    Smoking status: Never     Passive exposure: Past    Smokeless tobacco: Never   Vaping Use    Vaping status: Never Used   Substance and Sexual Activity    Alcohol use: Yes     Alcohol/week: 1.0 standard drink of alcohol     Types: 1 Glasses of wine per week     Comment: social    Drug use: No    Sexual activity: Yes     Partners: Male     Birth control/protection: Condom Male   Other Topics Concern    Not on file   Social History Narrative    Not on file     Social Drivers of Health     Financial Resource Strain: Not on file   Food Insecurity: Not on file   Transportation Needs: Not on file   Physical Activity: Not on file   Stress: Not on file   Social Connections: Unknown (6/18/2024)    Received from [employer]    Social Perficient     How often do you feel lonely or isolated from those around you? (Adult - for ages 18 years and over): Not on file   Intimate Partner Violence: Not on file   Housing Stability: Not on file       Review of Systems     Constitutional: Negative.   Respiratory: Negative.    Cardiovascular: Negative   Gastrointestinal: Negative   Breasts: As noted above.    Genitourinary: As noted above.   Psychiatric: Negative     Objective      /86 (BP Location: Left arm, Patient Position: Sitting, Cuff Size: Standard)   Wt 119 kg (262 lb)   LMP 11/12/2024   BMI 44.97 kg/m²     Physical Examination:    Patient appears well and is not in distress  Breasts are symmetrical without mass, tenderness, nipple discharge, skin changes or adenopathy.   Abdomen is soft and nontender without masses.   External genitals are normal without lesions or rashes.  Urethral meatus and urethra are normal  Bladder is normal to palpation  Vagina is without discharge or bleeding. + cystocele and rectocele. [place] white discharge.   Cervix is normal without discharge or lesion.   Uterus is normal, mobile, nontender without palpable mass.  Adnexa are normal, nontender, without palpable mass.

## 2025-01-20 ENCOUNTER — TELEPHONE (OUTPATIENT)
Dept: OBGYN CLINIC | Facility: CLINIC | Age: 55
End: 2025-01-20

## 2025-01-28 NOTE — TELEPHONE ENCOUNTER
Patients is returning your call regarding prolapse. Should she get a second opinion. She had a bladder test done Friday and when they put the catheter in it hit the prolapse and she is still having discomfort from that.  Patient can't answer until after 3 pm

## 2025-01-30 ENCOUNTER — PATIENT MESSAGE (OUTPATIENT)
Dept: OBGYN CLINIC | Facility: MEDICAL CENTER | Age: 55
End: 2025-01-30

## 2025-01-30 NOTE — PATIENT COMMUNICATION
LMOM returning pt's mychart message.   No reported sx of prolapse at annual so comfortable monitoring weakness. Would likely benefit from weight reduction, exercise, PFPT to prevent worsening but does not need second opinion on surgery unless symptomatic.

## 2025-02-01 LAB — COLOGUARD RESULT REPORTABLE: NEGATIVE

## 2025-02-02 ENCOUNTER — RESULTS FOLLOW-UP (OUTPATIENT)
Dept: FAMILY MEDICINE CLINIC | Facility: CLINIC | Age: 55
End: 2025-02-02

## 2025-03-13 ENCOUNTER — OFFICE VISIT (OUTPATIENT)
Dept: FAMILY MEDICINE CLINIC | Facility: CLINIC | Age: 55
End: 2025-03-13
Payer: COMMERCIAL

## 2025-03-13 VITALS
HEIGHT: 64 IN | SYSTOLIC BLOOD PRESSURE: 132 MMHG | WEIGHT: 266.4 LBS | HEART RATE: 84 BPM | OXYGEN SATURATION: 98 % | BODY MASS INDEX: 45.48 KG/M2 | TEMPERATURE: 97.3 F | DIASTOLIC BLOOD PRESSURE: 86 MMHG

## 2025-03-13 DIAGNOSIS — E66.01 MORBID OBESITY WITH BMI OF 45.0-49.9, ADULT (HCC): ICD-10-CM

## 2025-03-13 DIAGNOSIS — J30.1 SEASONAL ALLERGIC RHINITIS DUE TO POLLEN: ICD-10-CM

## 2025-03-13 DIAGNOSIS — J45.30 MILD PERSISTENT ASTHMA WITHOUT COMPLICATION: ICD-10-CM

## 2025-03-13 DIAGNOSIS — Z00.00 ANNUAL PHYSICAL EXAM: Primary | ICD-10-CM

## 2025-03-13 DIAGNOSIS — I10 PRIMARY HYPERTENSION: ICD-10-CM

## 2025-03-13 PROCEDURE — 99396 PREV VISIT EST AGE 40-64: CPT | Performed by: FAMILY MEDICINE

## 2025-03-13 PROCEDURE — 99214 OFFICE O/P EST MOD 30 MIN: CPT | Performed by: FAMILY MEDICINE

## 2025-03-13 RX ORDER — PHENTERMINE HYDROCHLORIDE 37.5 MG/1
37.5 TABLET ORAL DAILY PRN
Qty: 30 TABLET | Refills: 1 | Status: SHIPPED | OUTPATIENT
Start: 2025-03-13

## 2025-03-13 NOTE — PATIENT INSTRUCTIONS
"Patient Education     Routine physical for adults   The Basics   Written by the doctors and editors at [facility]   What is a physical? -- A physical is a routine visit, or \"check-up,\" with your doctor. You might also hear it called a \"wellness visit\" or \"preventive visit.\"  During each visit, the doctor will:   Ask about your physical and mental health   Ask about your habits, behaviors, and lifestyle   Do an exam   Give you vaccines if needed   Talk to you about any medicines you take   Give advice about your health   Answer your questions  Getting regular check-ups is an important part of taking care of your health. It can help your doctor find and treat any problems you have. But it's also important for preventing health problems.  A routine physical is different from a \"sick visit.\" A sick visit is when you see a doctor because of a health concern or problem. Since physicals are scheduled ahead of time, you can think about what you want to ask the doctor.  How often should I get a physical? -- It depends on your age and health. In general, for people age 21 years and older:   If you are younger than 50 years, you might be able to get a physical every 3 years.   If you are 50 years or older, your doctor might recommend a physical every year.  If you have an ongoing health condition, like diabetes or high blood pressure, your doctor will probably want to see you more often.  What happens during a physical? -- In general, each visit will include:   Physical exam - The doctor or nurse will check your height, weight, heart rate, and blood pressure. They will also look at your eyes and ears. They will ask about how you are feeling and whether you have any symptoms that bother you.   Medicines - It's a good idea to bring a list of all the medicines you take to each doctor visit. Your doctor will talk to you about your medicines and answer any questions. Tell them if you are having any side effects that bother you. You " "should also tell them if you are having trouble paying for any of your medicines.   Habits and behaviors - This includes:   Your diet   Your exercise habits   Whether you smoke, drink alcohol, or use drugs   Whether you are sexually active   Whether you feel safe at home  Your doctor will talk to you about things you can do to improve your health and lower your risk of health problems. They will also offer help and support. For example, if you want to quit smoking, they can give you advice and might prescribe medicines. If you want to improve your diet or get more physical activity, they can help you with this, too.   Lab tests, if needed - The tests you get will depend on your age and situation. For example, your doctor might want to check your:   Cholesterol   Blood sugar   Iron level   Vaccines - The recommended vaccines will depend on your age, health, and what vaccines you already had. Vaccines are very important because they can prevent certain serious or deadly infections.   Discussion of screening - \"Screening\" means checking for diseases or other health problems before they cause symptoms. Your doctor can recommend screening based on your age, risk, and preferences. This might include tests to check for:   Cancer, such as breast, prostate, cervical, ovarian, colorectal, prostate, lung, or skin cancer   Sexually transmitted infections, such as chlamydia and gonorrhea   Mental health conditions like depression and anxiety  Your doctor will talk to you about the different types of screening tests. They can help you decide which screenings to have. They can also explain what the results might mean.   Answering questions - The physical is a good time to ask the doctor or nurse questions about your health. If needed, they can refer you to other doctors or specialists, too.  Adults older than 65 years often need other care, too. As you get older, your doctor will talk to you about:   How to prevent falling at " home   Hearing or vision tests   Memory testing   How to take your medicines safely   Making sure that you have the help and support you need at home  All topics are updated as new evidence becomes available and our peer review process is complete.  This topic retrieved from [employer] on: May 02, 2024.  Topic 071969 Version 1.0  Release: 32.4.3 - C32.122  © 2024 UpToDate, Inc. and/or its affiliates. All rights reserved.  Consumer Information Use and Disclaimer   Disclaimer: This generalized information is a limited summary of diagnosis, treatment, and/or medication information. It is not meant to be comprehensive and should be used as a tool to help the user understand and/or assess potential diagnostic and treatment options. It does NOT include all information about conditions, treatments, medications, side effects, or risks that may apply to a specific patient. It is not intended to be medical advice or a substitute for the medical advice, diagnosis, or treatment of a health care provider based on the health care provider's examination and assessment of a patient's specific and unique circumstances. Patients must speak with a health care provider for complete information about their health, medical questions, and treatment options, including any risks or benefits regarding use of medications. This information does not endorse any treatments or medications as safe, effective, or approved for treating a specific patient. UpToDate, Inc. and its affiliates disclaim any warranty or liability relating to this information or the use thereof.The use of this information is governed by the Terms of Use, available at https://www.[employer].com/en/know/clinical-effectiveness-terms. 2024© UpToDate, Inc. and its affiliates and/or licensors. All rights reserved.  Copyright   © 2024 UpToDate, Inc. and/or its affiliates. All rights reserved.

## 2025-03-13 NOTE — PROGRESS NOTES
Adult Annual Physical  Name: Rita Cardona      : 1970      MRN: [id number]  Encounter Provider: Coby Soto MD  Encounter Date: 3/13/2025   Encounter department: [facility]    Assessment & Plan  Annual physical exam    Orders:    CBC and differential; Future    Comprehensive metabolic panel; Future    Hemoglobin A1C; Future    Lipid panel; Future    TSH, 3rd generation; Future    Morbid obesity with BMI of 45.0-49.9, adult (HCC)    F/u 1 month  Orders:    phentermine (ADIPEX-P) 37.5 MG tablet; Take 1 tablet (37.5 mg total) by mouth daily as needed (appetite control)    CBC and differential; Future    Comprehensive metabolic panel; Future    Hemoglobin A1C; Future    Lipid panel; Future    TSH, 3rd generation; Future    Mild persistent asthma without complication         Seasonal allergic rhinitis due to pollen  Navage for prevention, xyzal, nasal spray       Primary hypertension  amlodipine         Preventive Screenings:  - Diabetes Screening: orders placed  - Cholesterol Screening: orders placed   - Hepatitis C screening: screening not indicated   - HIV screening: screening not indicated   - Breast cancer screening: screening up-to-date   - Colon cancer screening: screening up-to-date   - Lung cancer screening: screening not indicated     Counseling/Anticipatory Guidance:    - Diet: discussed recommendations for a healthy/well-balanced diet.   - Exercise: the importance of regular exercise/physical activity was discussed. Recommend exercise 3-5 times per week for at least 30 minutes.          History of Present Illness     Adult Annual Physical:  Patient presents for annual physical. Here for cpx, notes allergies trigger her asthma. Has been on allergy shots from 7th grade until . Does not want to return to that. Takes her allergy meds year round, nasal spray. She has started using the navage since . Bid to tid. Using humidifier. Uses local honey. Several food  allergies, dose dependent. Has been following with GYN, s/p pelvic sling maybe 15 yrs ago, pt with some uterine prolapse, notes more incontinence. Known to [facility] pelvic medicine, Dr. Schreiber. Had bulkamid procedure to help with JAYLON. Still concerned about rectocele and vaginal/uterine prolapse. Working on kegel, retraining her brain with bladder demands. Has had decrease in her urgency. Right now grade 2 rectocele and no symptoms. Pt was advised to hold of on correction until she has more symptoms, difficulty having BM, organs protruding etc. Wants to work on weight loss to reduce pressure on her organs. Would like to try med for weight loss. Stress eating, carbs, diet can be limited by food allergies. Eat 3 meals a day. Watches her salt. BP generally controlled on her BP med. More fruits and veggies. Reduced her soda. Trying to limit eating out. Recently gained 8-10 pounds from steroids for her breathing. Taking amlodipine daily, 10mg. .     Diet and Physical Activity:  - Diet/Nutrition: well balanced diet, consuming 3-5 servings of fruits/vegetables daily, portion control and heart healthy (low sodium) diet. limited by food allergies  - Exercise: walking and 5-7 times a week on average.    General Health:  - Sleep: sleeps well.  - Hearing: requires use of hearing aids and decreased hearing bilateral ears.  - Vision: most recent eye exam < 1 year ago and wears glasses.  - Dental: regular dental visits.    /GYN Health:  - Follows with GYN: yes.   - Menopause: postmenopausal.   - History of STDs: no    Review of Systems   Constitutional:  Positive for unexpected weight change. Negative for fatigue and fever.   HENT:  Negative for congestion.    Eyes:  Negative for visual disturbance.   Respiratory:  Negative for chest tightness and shortness of breath.    Cardiovascular:  Negative for chest pain and palpitations.   Gastrointestinal:  Negative for abdominal pain, constipation and diarrhea.   Genitourinary:  Positive  "for frequency and urgency. Negative for difficulty urinating.   Musculoskeletal:  Negative for arthralgias.   Neurological:  Negative for headaches.   Hematological:  Does not bruise/bleed easily.         Objective   /86 (BP Location: Left arm, Patient Position: Sitting, Cuff Size: Large)   Pulse 84   Temp (!) 97.3 °F (36.3 °C) (Temporal)   Ht 5' 4\" (1.626 m)   Wt 121 kg (266 lb 6.4 oz)   SpO2 98%   BMI 45.73 kg/m²     Physical Exam  Vitals and nursing note reviewed.   Constitutional:       General: She is not in acute distress.     Appearance: Normal appearance. She is well-developed.   HENT:      Head: Normocephalic and atraumatic.      Right Ear: Tympanic membrane, ear canal and external ear normal.      Left Ear: Tympanic membrane, ear canal and external ear normal.      Nose: Nose normal.      Mouth/Throat:      Mouth: Mucous membranes are moist.      Pharynx: Oropharynx is clear.   Eyes:      Conjunctiva/sclera: Conjunctivae normal.      Pupils: Pupils are equal, round, and reactive to light.   Cardiovascular:      Rate and Rhythm: Normal rate and regular rhythm.      Heart sounds: Normal heart sounds. No murmur heard.  Pulmonary:      Effort: Pulmonary effort is normal. No respiratory distress.      Breath sounds: Normal breath sounds.   Abdominal:      General: Bowel sounds are normal.      Palpations: Abdomen is soft.      Tenderness: There is no abdominal tenderness.   Musculoskeletal:         General: No swelling. Normal range of motion.      Cervical back: Normal range of motion and neck supple.      Right lower leg: No edema.      Left lower leg: No edema.   Lymphadenopathy:      Cervical: No cervical adenopathy.   Skin:     General: Skin is warm and dry.      Capillary Refill: Capillary refill takes less than 2 seconds.   Neurological:      General: No focal deficit present.      Mental Status: She is alert and oriented to person, place, and time.      Deep Tendon Reflexes: Reflexes normal. "   Psychiatric:         Mood and Affect: Mood normal.         Behavior: Behavior normal.         Thought Content: Thought content normal.         Judgment: Judgment normal.

## 2025-04-17 ENCOUNTER — APPOINTMENT (OUTPATIENT)
Dept: LAB | Facility: MEDICAL CENTER | Age: 55
End: 2025-04-17
Attending: FAMILY MEDICINE
Payer: COMMERCIAL

## 2025-04-17 ENCOUNTER — OFFICE VISIT (OUTPATIENT)
Dept: FAMILY MEDICINE CLINIC | Facility: CLINIC | Age: 55
End: 2025-04-17
Payer: COMMERCIAL

## 2025-04-17 VITALS
TEMPERATURE: 97.6 F | HEART RATE: 76 BPM | WEIGHT: 252.2 LBS | DIASTOLIC BLOOD PRESSURE: 70 MMHG | SYSTOLIC BLOOD PRESSURE: 132 MMHG | OXYGEN SATURATION: 97 % | BODY MASS INDEX: 43.06 KG/M2 | HEIGHT: 64 IN

## 2025-04-17 DIAGNOSIS — J45.30 MILD PERSISTENT ASTHMA WITHOUT COMPLICATION: ICD-10-CM

## 2025-04-17 DIAGNOSIS — Z00.00 ANNUAL PHYSICAL EXAM: ICD-10-CM

## 2025-04-17 DIAGNOSIS — I10 PRIMARY HYPERTENSION: ICD-10-CM

## 2025-04-17 DIAGNOSIS — E66.01 MORBID OBESITY WITH BMI OF 40.0-44.9, ADULT (HCC): Primary | ICD-10-CM

## 2025-04-17 DIAGNOSIS — J30.1 SEASONAL ALLERGIC RHINITIS DUE TO POLLEN: ICD-10-CM

## 2025-04-17 DIAGNOSIS — E66.01 MORBID OBESITY WITH BMI OF 45.0-49.9, ADULT (HCC): ICD-10-CM

## 2025-04-17 PROCEDURE — 80053 COMPREHEN METABOLIC PANEL: CPT

## 2025-04-17 PROCEDURE — 85025 COMPLETE CBC W/AUTO DIFF WBC: CPT

## 2025-04-17 PROCEDURE — 80061 LIPID PANEL: CPT

## 2025-04-17 PROCEDURE — 83036 HEMOGLOBIN GLYCOSYLATED A1C: CPT

## 2025-04-17 PROCEDURE — 36415 COLL VENOUS BLD VENIPUNCTURE: CPT

## 2025-04-17 PROCEDURE — 84443 ASSAY THYROID STIM HORMONE: CPT

## 2025-04-17 PROCEDURE — 99214 OFFICE O/P EST MOD 30 MIN: CPT | Performed by: FAMILY MEDICINE

## 2025-04-17 RX ORDER — PHENTERMINE HYDROCHLORIDE 30 MG/1
30 CAPSULE ORAL EVERY MORNING
Qty: 30 CAPSULE | Refills: 1 | Status: SHIPPED | OUTPATIENT
Start: 2025-04-17

## 2025-04-17 NOTE — PROGRESS NOTES
Name: Rita Cardona      : 1970      MRN: [id number]  Encounter Provider: Coby Soto MD  Encounter Date: 2025   Encounter department: [facility]  :  Assessment & Plan  Morbid obesity with BMI of 40.0-44.9, adult (HCC)    Continue weight loss efforts  Orders:    phentermine 30 MG capsule; Take 1 capsule (30 mg total) by mouth every morning    Mild persistent asthma without complication  stable       Primary hypertension  Controlled, can reduce the amlodipine to 5mg and check BP at home       Seasonal allergic rhinitis due to pollen  Xyzal, nasacort, watching any hints of asthma involvement              History of Present Illness   Here for med f/u. Lost 14# on phentermine, would like to reduce the dose as the 37.5mg keeps her up at night. Exercising every other day. Walking in school before classes start. Cannot exercise outside with allergies aggravating the asthma. Modifying the diet, more salad and protein, less fast food. Hasn't been taking her inhalers, only when she flares. Getting through workouts without them. Using xyzal and nasacort year round for allergies. BP controlled but asks about reducing the amlodipine dose, karina given ongoing weight loss.     Sore Throat   Pertinent negatives include no abdominal pain, coughing, ear pain, shortness of breath or vomiting.     Review of Systems   Constitutional:  Negative for chills and fever.   HENT:  Positive for sore throat. Negative for ear pain.    Eyes:  Negative for pain and visual disturbance.   Respiratory:  Negative for cough and shortness of breath.    Cardiovascular:  Negative for chest pain and palpitations.   Gastrointestinal:  Negative for abdominal pain and vomiting.   Genitourinary:  Negative for dysuria and hematuria.   Musculoskeletal:  Negative for arthralgias and back pain.   Skin:  Negative for color change and rash.   Allergic/Immunologic: Positive for environmental allergies.   Neurological:   "Negative for seizures and syncope.   All other systems reviewed and are negative.      Objective   /70 (BP Location: Left arm, Patient Position: Sitting, Cuff Size: Standard)   Pulse 76   Temp 97.6 °F (36.4 °C)   Ht 5' 4\" (1.626 m)   Wt 114 kg (252 lb 3.2 oz)   SpO2 97%   Breastfeeding No   BMI 43.29 kg/m²      Physical Exam  Vitals reviewed.   Constitutional:       Appearance: Normal appearance. She is well-developed.   HENT:      Head:      Comments: No sinus tenderness     Mouth/Throat:      Pharynx: Uvula midline. Posterior oropharyngeal erythema present.   Cardiovascular:      Rate and Rhythm: Normal rate and regular rhythm.      Heart sounds: Normal heart sounds.   Pulmonary:      Effort: Pulmonary effort is normal.      Breath sounds: Normal breath sounds.   Lymphadenopathy:      Cervical: No cervical adenopathy.   Skin:     General: Skin is warm.   Neurological:      General: No focal deficit present.      Mental Status: She is alert and oriented to person, place, and time.   Psychiatric:         Mood and Affect: Mood normal.         Behavior: Behavior normal.         Thought Content: Thought content normal.         Judgment: Judgment normal.         "

## 2025-04-18 LAB
ALBUMIN SERPL BCG-MCNC: 4.6 G/DL (ref 3.5–5)
ALP SERPL-CCNC: 72 U/L (ref 34–104)
ALT SERPL W P-5'-P-CCNC: 38 U/L (ref 7–52)
ANION GAP SERPL CALCULATED.3IONS-SCNC: 8 MMOL/L (ref 4–13)
AST SERPL W P-5'-P-CCNC: 27 U/L (ref 13–39)
BASOPHILS # BLD AUTO: 0.07 THOUSANDS/ÂΜL (ref 0–0.1)
BASOPHILS NFR BLD AUTO: 1 % (ref 0–1)
BILIRUB SERPL-MCNC: 0.48 MG/DL (ref 0.2–1)
BUN SERPL-MCNC: 10 MG/DL (ref 5–25)
CALCIUM SERPL-MCNC: 9.5 MG/DL (ref 8.4–10.2)
CHLORIDE SERPL-SCNC: 99 MMOL/L (ref 96–108)
CHOLEST SERPL-MCNC: 243 MG/DL (ref ?–200)
CO2 SERPL-SCNC: 31 MMOL/L (ref 21–32)
CREAT SERPL-MCNC: 0.94 MG/DL (ref 0.6–1.3)
EOSINOPHIL # BLD AUTO: 0.17 THOUSAND/ÂΜL (ref 0–0.61)
EOSINOPHIL NFR BLD AUTO: 2 % (ref 0–6)
ERYTHROCYTE [DISTWIDTH] IN BLOOD BY AUTOMATED COUNT: 13.2 % (ref 11.6–15.1)
EST. AVERAGE GLUCOSE BLD GHB EST-MCNC: 151 MG/DL
GFR SERPL CREATININE-BSD FRML MDRD: 69 ML/MIN/1.73SQ M
GLUCOSE P FAST SERPL-MCNC: 118 MG/DL (ref 65–99)
HBA1C MFR BLD: 6.9 %
HCT VFR BLD AUTO: 48 % (ref 34.8–46.1)
HDLC SERPL-MCNC: 56 MG/DL
HGB BLD-MCNC: 15.4 G/DL (ref 11.5–15.4)
IMM GRANULOCYTES # BLD AUTO: 0.02 THOUSAND/UL (ref 0–0.2)
IMM GRANULOCYTES NFR BLD AUTO: 0 % (ref 0–2)
LDLC SERPL CALC-MCNC: 160 MG/DL (ref 0–100)
LYMPHOCYTES # BLD AUTO: 1.83 THOUSANDS/ÂΜL (ref 0.6–4.47)
LYMPHOCYTES NFR BLD AUTO: 22 % (ref 14–44)
MCH RBC QN AUTO: 28.6 PG (ref 26.8–34.3)
MCHC RBC AUTO-ENTMCNC: 32.1 G/DL (ref 31.4–37.4)
MCV RBC AUTO: 89 FL (ref 82–98)
MONOCYTES # BLD AUTO: 0.43 THOUSAND/ÂΜL (ref 0.17–1.22)
MONOCYTES NFR BLD AUTO: 5 % (ref 4–12)
NEUTROPHILS # BLD AUTO: 5.8 THOUSANDS/ÂΜL (ref 1.85–7.62)
NEUTS SEG NFR BLD AUTO: 70 % (ref 43–75)
NONHDLC SERPL-MCNC: 187 MG/DL
NRBC BLD AUTO-RTO: 0 /100 WBCS
PLATELET # BLD AUTO: 313 THOUSANDS/UL (ref 149–390)
PMV BLD AUTO: 9.5 FL (ref 8.9–12.7)
POTASSIUM SERPL-SCNC: 3.7 MMOL/L (ref 3.5–5.3)
PROT SERPL-MCNC: 7.5 G/DL (ref 6.4–8.4)
RBC # BLD AUTO: 5.39 MILLION/UL (ref 3.81–5.12)
SODIUM SERPL-SCNC: 138 MMOL/L (ref 135–147)
TRIGL SERPL-MCNC: 135 MG/DL (ref ?–150)
TSH SERPL DL<=0.05 MIU/L-ACNC: 1.81 UIU/ML (ref 0.45–4.5)
WBC # BLD AUTO: 8.32 THOUSAND/UL (ref 4.31–10.16)

## 2025-04-19 ENCOUNTER — RESULTS FOLLOW-UP (OUTPATIENT)
Dept: FAMILY MEDICINE CLINIC | Facility: CLINIC | Age: 55
End: 2025-04-19

## 2025-04-22 DIAGNOSIS — J45.31 MILD PERSISTENT ASTHMA WITH ACUTE EXACERBATION: ICD-10-CM

## 2025-04-22 DIAGNOSIS — J01.00 ACUTE NON-RECURRENT MAXILLARY SINUSITIS: Primary | ICD-10-CM

## 2025-04-22 RX ORDER — LEVOFLOXACIN 500 MG/1
500 TABLET, FILM COATED ORAL EVERY 24 HOURS
Qty: 10 TABLET | Refills: 0 | Status: SHIPPED | OUTPATIENT
Start: 2025-04-22 | End: 2025-05-02

## 2025-04-26 DIAGNOSIS — R03.0 ELEVATED BLOOD PRESSURE READING: ICD-10-CM

## 2025-04-27 RX ORDER — AMLODIPINE BESYLATE 10 MG/1
10 TABLET ORAL DAILY
Qty: 90 TABLET | Refills: 0 | Status: SHIPPED | OUTPATIENT
Start: 2025-04-27

## 2025-05-19 ENCOUNTER — NURSE TRIAGE (OUTPATIENT)
Age: 55
End: 2025-05-19

## 2025-05-19 NOTE — TELEPHONE ENCOUNTER
Regarding: abdominal lump, tender to touch, constipation  ----- Message from Stephanie HILARIO sent at 5/19/2025 12:58 PM EDT -----  Dr. COLÓN - there is this large bump above my belly button that hurts to the touch.  I have been constipated now for about a week off and on.  I tried Colace stool softener to go a full bowl movement - but I only go a little.  So then I tried the Colace 2 in 1 Stool Softener and Laxative - again I only go a little bit.  I don't know if it is the diet meds or the prolapse.

## 2025-05-19 NOTE — TELEPHONE ENCOUNTER
FOLLOW UP: Attempted to triage patient, left VM.    REASON FOR CONVERSATION: No Triage Call    SYMPTOMS: ee MyChart message    OTHER: NA    DISPOSITION: No Contact Call    Reason for Disposition   Third attempt to contact caller AND no contact made. Phone number verified.    Protocols used: No Contact or Duplicate Contact Call-Adult-[place]

## 2025-05-20 PROBLEM — R03.0 ELEVATED BLOOD PRESSURE READING: Status: RESOLVED | Noted: 2022-11-10 | Resolved: 2025-05-20

## 2025-05-21 ENCOUNTER — OFFICE VISIT (OUTPATIENT)
Dept: FAMILY MEDICINE CLINIC | Facility: CLINIC | Age: 55
End: 2025-05-21
Payer: COMMERCIAL

## 2025-05-21 VITALS
WEIGHT: 244.4 LBS | SYSTOLIC BLOOD PRESSURE: 136 MMHG | HEART RATE: 96 BPM | OXYGEN SATURATION: 96 % | DIASTOLIC BLOOD PRESSURE: 92 MMHG | HEIGHT: 64 IN | TEMPERATURE: 97 F | BODY MASS INDEX: 41.73 KG/M2

## 2025-05-21 DIAGNOSIS — K42.9 UMBILICAL HERNIA WITHOUT OBSTRUCTION AND WITHOUT GANGRENE: Primary | ICD-10-CM

## 2025-05-21 PROCEDURE — 99213 OFFICE O/P EST LOW 20 MIN: CPT | Performed by: PHYSICIAN ASSISTANT

## 2025-05-21 RX ORDER — OLOPATADINE HYDROCHLORIDE 1 MG/ML
1 SOLUTION/ DROPS OPHTHALMIC 2 TIMES DAILY
COMMUNITY

## 2025-05-21 NOTE — PROGRESS NOTES
"Name: Rita Cardona      : 1970      MRN: [id number]  Encounter Provider: Kathy Mckeon PA-C  Encounter Date: 2025   Encounter department: [facility]  :  Assessment & Plan  Umbilical hernia without obstruction and without gangrene                History of Present Illness   HPI  Review of Systems   Respiratory:  Negative for cough and shortness of breath.    Gastrointestinal:  Positive for abdominal pain and constipation.        Mass   above  umbilicus   Genitourinary:  Negative for difficulty urinating.       Objective   /92 (BP Location: Right arm, Patient Position: Sitting, Cuff Size: Large)   Pulse 96   Temp (!) 97 °F (36.1 °C) (Temporal)   Ht 5' 4\" (1.626 m)   Wt 111 kg (244 lb 6.4 oz)   SpO2 96%   BMI 41.95 kg/m²      Physical Exam  Vitals and nursing note reviewed.   Constitutional:       General: She is not in acute distress.     Appearance: She is obese. She is not ill-appearing.   HENT:      Head: Normocephalic and atraumatic.      Right Ear: External ear normal.      Left Ear: External ear normal.     Cardiovascular:      Rate and Rhythm: Normal rate and regular rhythm.      Heart sounds: Normal heart sounds. No murmur heard.  Pulmonary:      Effort: Pulmonary effort is normal.      Breath sounds: Normal breath sounds.   Abdominal:      General: Abdomen is protuberant. There is no distension.      Tenderness: There is no abdominal tenderness.      Hernia: A hernia is present. Hernia is present in the umbilical area.      Comments: Patient has a nice sized umbilical hernia.  Is reducible at this point.  Refer to general surgeon.  GYN explained if she gets redness or pain in the area she may have a strangulated hernia and report to the ER         "

## 2025-05-22 ENCOUNTER — TELEPHONE (OUTPATIENT)
Age: 55
End: 2025-05-22

## 2025-05-22 NOTE — TELEPHONE ENCOUNTER
"FOLLOW UP: Appointment on 6/2/25 with r.Bloch    REASON FOR CONVERSATION: Umbilical Hernia    SYMPTOMS: Discomfort. Constipation    OTHER: Patient calling to discuss her symptoms and what her options are. Believes she has had the umbilical hernia for about 1 week. Has recently lost 25 lbs and now can see the bulging. Is experiencing discomfort/pain with activities. Has been lifting 8 lb kettle ball at gym. Recommended patient go easy for now until her appointment on 6/2/25 with Dr.Bloch. Should she experience severe symptoms of \"possible incarceration\" she needs to go to the closest ER for evaluation. Verbalized understanding.    DISPOSITION: See Physician With Next Available Appointment    "

## 2025-05-25 ENCOUNTER — PATIENT MESSAGE (OUTPATIENT)
Dept: FAMILY MEDICINE CLINIC | Facility: CLINIC | Age: 55
End: 2025-05-25

## 2025-06-02 ENCOUNTER — APPOINTMENT (OUTPATIENT)
Dept: LAB | Age: 55
End: 2025-06-02
Attending: SURGERY
Payer: COMMERCIAL

## 2025-06-02 ENCOUNTER — OFFICE VISIT (OUTPATIENT)
Age: 55
End: 2025-06-02
Attending: PHYSICIAN ASSISTANT
Payer: COMMERCIAL

## 2025-06-02 ENCOUNTER — TELEPHONE (OUTPATIENT)
Age: 55
End: 2025-06-02

## 2025-06-02 VITALS
TEMPERATURE: 97.6 F | BODY MASS INDEX: 41.01 KG/M2 | HEIGHT: 64 IN | OXYGEN SATURATION: 98 % | HEART RATE: 77 BPM | WEIGHT: 240.2 LBS | DIASTOLIC BLOOD PRESSURE: 80 MMHG | SYSTOLIC BLOOD PRESSURE: 120 MMHG

## 2025-06-02 DIAGNOSIS — Z01.818 ENCOUNTER FOR PREADMISSION TESTING: Primary | ICD-10-CM

## 2025-06-02 DIAGNOSIS — Z01.818 ENCOUNTER FOR PREADMISSION TESTING: ICD-10-CM

## 2025-06-02 DIAGNOSIS — K42.0 UMBILICAL HERNIA WITH OBSTRUCTION: Primary | ICD-10-CM

## 2025-06-02 LAB
ATRIAL RATE: 64 BPM
P AXIS: 27 DEGREES
PR INTERVAL: 100 MS
QRS AXIS: 65 DEGREES
QRSD INTERVAL: 90 MS
QT INTERVAL: 396 MS
QTC INTERVAL: 409 MS
T WAVE AXIS: 53 DEGREES
VENTRICULAR RATE: 64 BPM

## 2025-06-02 PROCEDURE — 99243 OFF/OP CNSLTJ NEW/EST LOW 30: CPT | Performed by: SURGERY

## 2025-06-02 PROCEDURE — 93010 ELECTROCARDIOGRAM REPORT: CPT | Performed by: INTERNAL MEDICINE

## 2025-06-02 RX ORDER — SODIUM CHLORIDE, SODIUM LACTATE, POTASSIUM CHLORIDE, CALCIUM CHLORIDE 600; 310; 30; 20 MG/100ML; MG/100ML; MG/100ML; MG/100ML
20 INJECTION, SOLUTION INTRAVENOUS CONTINUOUS
OUTPATIENT
Start: 2025-06-02

## 2025-06-02 NOTE — PROGRESS NOTES
Name: Rita Cardona      : 1970      MRN: [id number]  Encounter Provider: Robert Bloch, MD  Encounter Date: 2025   Encounter department: [facility] [place]  :  Assessment & Plan  Umbilical hernia with obstruction    Orders:    Case request operating room: REPAIR HERNIA UMBILICAL LAPAROSCOPIC W/ ROBOTICS; Standing        History of Present Illness   Rita Cardona is a 55 y.o. female who presents   The patient is a 55-year-old female who has a couple week history of a painful umbilical hernia.  She does not know how long it is actually been there and there is no inciting event that she knows of.  She has had uterine prolapse with a sling but she has had no abdominal surgeries.  She is on a weight loss regimen at present      Review of Systems   Constitutional:         Lost 27 lbs  Covid x many  Covid shots x 5   HENT:  Positive for hearing loss.    Eyes:         Wear glasses   Respiratory:          Never smoked  Allergic rhinitis  asthma   Cardiovascular:         Htn   Gastrointestinal:         Had cologuard x 2   Endocrine: Negative.    Genitourinary:  Positive for hematuria and pelvic pain.   Musculoskeletal: Negative.    Skin: Negative.    Neurological: Negative.    Hematological: Negative.    Psychiatric/Behavioral: Negative.      as per HPI.  Past Medical History   Past Medical History[1]  Past Surgical History[2]  Family History[3]   reports that she has never smoked. She has been exposed to tobacco smoke. She has never used smokeless tobacco. She reports current alcohol use of about 1.0 standard drink of alcohol per week. She reports that she does not use drugs.  Current Outpatient Medications   Medication Instructions    Albuterol Sulfate 108 (90 Base) MCG/ACT AEPB 2 puffs, Inhalation, Every 4 hours PRN    albuterol 2.5 mg, Nebulization, Every 6 hours PRN    amLODIPine (NORVASC) 10 mg, Oral, Daily    b complex vitamins capsule 1 capsule, Daily     "beclomethasone (Qvar RediHaler) 80 MCG/ACT inhaler 2 puffs, Inhalation, Daily, Rinse mouth after use.    budesonide (PULMICORT) 0.5 mg, Nebulization, 2 times daily, Rinse mouth after use.    Levocetirizine Dihydrochloride (XYZAL PO) Take by mouth    Multiple Vitamin-Folic Acid TABS Take by mouth    Nebulizers (Compressor Nebulizer) MISC Does not apply, Every 6 hours PRN    olopatadine (PATANOL) 0.1 % ophthalmic solution 1 drop, 2 times daily    phentermine 30 mg, Oral, Every morning    Triamcinolone Acetonide (NASACORT AQ NA) into each nostril   Allergies[4]      Objective   /80 (BP Location: Left arm, Patient Position: Sitting, Cuff Size: Standard)   Pulse 77   Temp 97.6 °F (36.4 °C) (Tympanic)   Ht 5' 4\" (1.626 m)   Wt 109 kg (240 lb 3.2 oz)   SpO2 98%   BMI 41.23 kg/m²      Physical Exam  Vitals reviewed.   Constitutional:       Appearance: She is obese. She is not ill-appearing.      Comments: She is technically morbidly obese   HENT:      Head: Normocephalic and atraumatic.     Eyes:      General: No scleral icterus.     Conjunctiva/sclera: Conjunctivae normal.       Cardiovascular:      Rate and Rhythm: Normal rate and regular rhythm.      Heart sounds: Normal heart sounds. No murmur heard.  Pulmonary:      Effort: Pulmonary effort is normal. No respiratory distress.      Breath sounds: Normal breath sounds. No stridor. No wheezing, rhonchi or rales.   Abdominal:      Hernia: A hernia is present.      Comments: Visible and palpable umbilical hernia.  With her laying down I think this is about 2 cm across.  It cannot be reduced.  No inguinal hernia and no other masses     Musculoskeletal:         General: Normal range of motion.      Cervical back: Normal range of motion.   Lymphadenopathy:      Cervical: No cervical adenopathy.     Skin:     Coloration: Skin is not jaundiced.     Neurological:      Mental Status: She is alert and oriented to person, place, and time.     Psychiatric:         " Behavior: Behavior normal.     Impression: Umbilical hernia which is incarcerated.  Given her size and the fact that she has a virgin abdomen, I am going to do this robotically               [1]   Past Medical History:  Diagnosis Date    Allergic birth    Arthritis 5/17/2021    Asthma Birth    CTS (carpal tunnel syndrome) Feb 2019    Hypertension 11/2022    Due to covid    Pulmonary disease    [2]   Past Surgical History:  Procedure Laterality Date    BLADDER SURGERY      HAND RECONSTRUCTION Bilateral 5/2019-11/2019   [3]   Family History  Problem Relation Name Age of Onset    No Known Problems Mother      No Known Problems Father      No Known Problems Daughter      No Known Problems Maternal Grandmother      No Known Problems Maternal Grandfather      Breast cancer Paternal Grandmother Candy Winn 80    No Known Problems Paternal Grandfather      No Known Problems Son      Lung cancer Maternal Uncle      No Known Problems Paternal Aunt      Diabetes Family     [4]   Allergies  Allergen Reactions    Avocado (Diagnostic) - Food Allergy Vomiting    Banana - Food Allergy Vomiting    Erythromycin GI Intolerance    Oat - Food Allergy Diarrhea    Pulmicort [Budesonide] Irritability and Hyperactivity    Shellfish-Derived Products - Food Allergy Vomiting    Penicillins GI Intolerance     Stomach upset, needs to eat meal before taking med

## 2025-06-02 NOTE — H&P (VIEW-ONLY)
Name: Rita Cardona      : 1970      MRN: [id number]  Encounter Provider: Robert Bloch, MD  Encounter Date: 2025   Encounter department: [facility] [place]  :  Assessment & Plan  Umbilical hernia with obstruction    Orders:    Case request operating room: REPAIR HERNIA UMBILICAL LAPAROSCOPIC W/ ROBOTICS; Standing        History of Present Illness   Rita Cardona is a 55 y.o. female who presents   The patient is a 55-year-old female who has a couple week history of a painful umbilical hernia.  She does not know how long it is actually been there and there is no inciting event that she knows of.  She has had uterine prolapse with a sling but she has had no abdominal surgeries.  She is on a weight loss regimen at present      Review of Systems   Constitutional:         Lost 27 lbs  Covid x many  Covid shots x 5   HENT:  Positive for hearing loss.    Eyes:         Wear glasses   Respiratory:          Never smoked  Allergic rhinitis  asthma   Cardiovascular:         Htn   Gastrointestinal:         Had cologuard x 2   Endocrine: Negative.    Genitourinary:  Positive for hematuria and pelvic pain.   Musculoskeletal: Negative.    Skin: Negative.    Neurological: Negative.    Hematological: Negative.    Psychiatric/Behavioral: Negative.      as per HPI.  Past Medical History   Past Medical History[1]  Past Surgical History[2]  Family History[3]   reports that she has never smoked. She has been exposed to tobacco smoke. She has never used smokeless tobacco. She reports current alcohol use of about 1.0 standard drink of alcohol per week. She reports that she does not use drugs.  Current Outpatient Medications   Medication Instructions    Albuterol Sulfate 108 (90 Base) MCG/ACT AEPB 2 puffs, Inhalation, Every 4 hours PRN    albuterol 2.5 mg, Nebulization, Every 6 hours PRN    amLODIPine (NORVASC) 10 mg, Oral, Daily    b complex vitamins capsule 1 capsule, Daily     "beclomethasone (Qvar RediHaler) 80 MCG/ACT inhaler 2 puffs, Inhalation, Daily, Rinse mouth after use.    budesonide (PULMICORT) 0.5 mg, Nebulization, 2 times daily, Rinse mouth after use.    Levocetirizine Dihydrochloride (XYZAL PO) Take by mouth    Multiple Vitamin-Folic Acid TABS Take by mouth    Nebulizers (Compressor Nebulizer) MISC Does not apply, Every 6 hours PRN    olopatadine (PATANOL) 0.1 % ophthalmic solution 1 drop, 2 times daily    phentermine 30 mg, Oral, Every morning    Triamcinolone Acetonide (NASACORT AQ NA) into each nostril   Allergies[4]      Objective   /80 (BP Location: Left arm, Patient Position: Sitting, Cuff Size: Standard)   Pulse 77   Temp 97.6 °F (36.4 °C) (Tympanic)   Ht 5' 4\" (1.626 m)   Wt 109 kg (240 lb 3.2 oz)   SpO2 98%   BMI 41.23 kg/m²      Physical Exam  Vitals reviewed.   Constitutional:       Appearance: She is obese. She is not ill-appearing.      Comments: She is technically morbidly obese   HENT:      Head: Normocephalic and atraumatic.     Eyes:      General: No scleral icterus.     Conjunctiva/sclera: Conjunctivae normal.       Cardiovascular:      Rate and Rhythm: Normal rate and regular rhythm.      Heart sounds: Normal heart sounds. No murmur heard.  Pulmonary:      Effort: Pulmonary effort is normal. No respiratory distress.      Breath sounds: Normal breath sounds. No stridor. No wheezing, rhonchi or rales.   Abdominal:      Hernia: A hernia is present.      Comments: Visible and palpable umbilical hernia.  With her laying down I think this is about 2 cm across.  It cannot be reduced.  No inguinal hernia and no other masses     Musculoskeletal:         General: Normal range of motion.      Cervical back: Normal range of motion.   Lymphadenopathy:      Cervical: No cervical adenopathy.     Skin:     Coloration: Skin is not jaundiced.     Neurological:      Mental Status: She is alert and oriented to person, place, and time.     Psychiatric:         " Behavior: Behavior normal.     Impression: Umbilical hernia which is incarcerated.  Given her size and the fact that she has a virgin abdomen, I am going to do this robotically               [1]   Past Medical History:  Diagnosis Date    Allergic birth    Arthritis 5/17/2021    Asthma Birth    CTS (carpal tunnel syndrome) Feb 2019    Hypertension 11/2022    Due to covid    Pulmonary disease    [2]   Past Surgical History:  Procedure Laterality Date    BLADDER SURGERY      HAND RECONSTRUCTION Bilateral 5/2019-11/2019   [3]   Family History  Problem Relation Name Age of Onset    No Known Problems Mother      No Known Problems Father      No Known Problems Daughter      No Known Problems Maternal Grandmother      No Known Problems Maternal Grandfather      Breast cancer Paternal Grandmother Candy Winn 80    No Known Problems Paternal Grandfather      No Known Problems Son      Lung cancer Maternal Uncle      No Known Problems Paternal Aunt      Diabetes Family     [4]   Allergies  Allergen Reactions    Avocado (Diagnostic) - Food Allergy Vomiting    Banana - Food Allergy Vomiting    Erythromycin GI Intolerance    Oat - Food Allergy Diarrhea    Pulmicort [Budesonide] Irritability and Hyperactivity    Shellfish-Derived Products - Food Allergy Vomiting    Penicillins GI Intolerance     Stomach upset, needs to eat meal before taking med

## 2025-06-02 NOTE — TELEPHONE ENCOUNTER
Pt saw Dr Bloch today and was told to go have an EKG done.  The imaging center called because the order was never put into the pt chart.  I called the office and spoke with Carlie she is adding the order now to the pt chart

## 2025-06-03 ENCOUNTER — PATIENT MESSAGE (OUTPATIENT)
Dept: OBGYN CLINIC | Facility: MEDICAL CENTER | Age: 55
End: 2025-06-03

## 2025-06-06 RX ORDER — WHEAT DEXTRIN 1 G
TABLET,CHEWABLE ORAL
COMMUNITY

## 2025-06-06 RX ORDER — POLYETHYLENE GLYCOL 3350 17 G/17G
17 POWDER, FOR SOLUTION ORAL AS NEEDED
COMMUNITY

## 2025-06-06 NOTE — PRE-PROCEDURE INSTRUCTIONS
Pre-Surgery Instructions:   Medication Instructions    albuterol (2.5 mg/3 mL) 0.083 % nebulizer solution Uses PRN- OK to take day of surgery    Albuterol Sulfate 108 (90 Base) MCG/ACT AEPB Uses PRN- OK to take day of surgery    amLODIPine (NORVASC) 10 mg tablet Take day of surgery.    b complex vitamins capsule Stop taking 7 days prior to surgery.    docusate sodium (COLACE) 50 mg capsule Take night before surgery    Levocetirizine Dihydrochloride (XYZAL PO) Take night before surgery    olopatadine (PATANOL) 0.1 % ophthalmic solution Take day of surgery.    phentermine 30 MG capsule Stop taking 5 days prior to surgery.    polyethylene glycol (MIRALAX) 17 g packet Uses PRN- DO NOT take day of surgery    Wheat Dextrin (Benefiber) CHEW Hold day of surgery.    Medication instructions for day of surgery reviewed. Please take all instructed medications with only a sip of water. Please do not take any over the counter (non-prescribed) vitamins or supplements for one week prior to date of surgery.      You will receive a call one business day prior to surgery with an arrival time and hospital directions. If your surgery is scheduled on a Monday, the hospital will be calling you on the Friday prior to your surgery. If you have not heard from anyone by 8pm, please call the hospital supervisor through the hospital  at [phone]. ([place] [phone] or [place] [phone]).    Do not eat or drink anything after midnight the night before your surgery, including candy, mints, lifesavers, or chewing gum. Do not drink alcohol 24hrs before your surgery. Try not to smoke at least 24hrs before your surgery.       Follow the pre surgery showering instructions as listed in the “My Surgical Experience Booklet” or otherwise provided by your surgeon's office. Do not use a blade to shave the surgical area 1 week before surgery. It is okay to use a clean electric clippers up to 24 hours before surgery. Do not apply any  lotions, creams, including makeup, cologne, deodorant, or perfumes after showering on the day of your surgery. Do not use dry shampoo, hair spray, hair gel, or any type of hair products.     No contact lenses, eye make-up, or artificial eyelashes. Remove nail polish, including gel polish, and any artificial, gel, or acrylic nails if possible. Remove all jewelry including rings and body piercing jewelry.     Wear causal clothing that is easy to take on and off. Consider your type of surgery.    Keep any valuables, jewelry, piercings at home. Please bring any specially ordered equipment (sling, braces) if indicated.    Arrange for a responsible person to drive you to and from the hospital on the day of your surgery. Please confirm the visitor policy for the day of your procedure when you receive your phone call with an arrival time.     Call the surgeon's office with any new illnesses, exposures, or additional questions prior to surgery.    Please reference your “My Surgical Experience Booklet” for additional information to prepare for your upcoming surgery.

## 2025-06-16 ENCOUNTER — ANESTHESIA EVENT (OUTPATIENT)
Dept: PERIOP | Facility: HOSPITAL | Age: 55
End: 2025-06-16
Payer: COMMERCIAL

## 2025-06-17 ENCOUNTER — ANESTHESIA (OUTPATIENT)
Dept: PERIOP | Facility: HOSPITAL | Age: 55
End: 2025-06-17
Payer: COMMERCIAL

## 2025-06-17 ENCOUNTER — HOSPITAL ENCOUNTER (OUTPATIENT)
Facility: HOSPITAL | Age: 55
Setting detail: OUTPATIENT SURGERY
Discharge: HOME | End: 2025-06-17
Attending: SURGERY | Admitting: SURGERY
Payer: COMMERCIAL

## 2025-06-17 VITALS
OXYGEN SATURATION: 96 % | BODY MASS INDEX: 40.29 KG/M2 | SYSTOLIC BLOOD PRESSURE: 114 MMHG | RESPIRATION RATE: 17 BRPM | HEIGHT: 64 IN | DIASTOLIC BLOOD PRESSURE: 65 MMHG | HEART RATE: 57 BPM | WEIGHT: 236 LBS | TEMPERATURE: 97.5 F

## 2025-06-17 DIAGNOSIS — K42.0 UMBILICAL HERNIA WITH OBSTRUCTION: Primary | ICD-10-CM

## 2025-06-17 LAB
EXT PREGNANCY TEST URINE: NEGATIVE
EXT. CONTROL: NORMAL

## 2025-06-17 PROCEDURE — S2900 ROBOTIC SURGICAL SYSTEM: HCPCS | Performed by: SURGERY

## 2025-06-17 PROCEDURE — 88302 TISSUE EXAM BY PATHOLOGIST: CPT | Performed by: STUDENT IN AN ORGANIZED HEALTH CARE EDUCATION/TRAINING PROGRAM

## 2025-06-17 PROCEDURE — 49592 RPR AA HRN 1ST < 3 NCR/STRN: CPT | Performed by: PHYSICIAN ASSISTANT

## 2025-06-17 PROCEDURE — C1781 MESH (IMPLANTABLE): HCPCS | Performed by: SURGERY

## 2025-06-17 PROCEDURE — S2900 ROBOTIC SURGICAL SYSTEM: HCPCS | Performed by: PHYSICIAN ASSISTANT

## 2025-06-17 PROCEDURE — 81025 URINE PREGNANCY TEST: CPT | Performed by: ANESTHESIOLOGY

## 2025-06-17 PROCEDURE — 49592 RPR AA HRN 1ST < 3 NCR/STRN: CPT | Performed by: SURGERY

## 2025-06-17 DEVICE — VENTRALEX ST HERNIA PATCH, 8.0 CM (3.2"), CIRCLE
Type: IMPLANTABLE DEVICE | Site: UMBILICAL | Status: FUNCTIONAL
Brand: VENTRALEX

## 2025-06-17 RX ORDER — BUPIVACAINE HYDROCHLORIDE AND EPINEPHRINE 2.5; 5 MG/ML; UG/ML
INJECTION, SOLUTION EPIDURAL; INFILTRATION; INTRACAUDAL; PERINEURAL AS NEEDED
Status: DISCONTINUED | OUTPATIENT
Start: 2025-06-17 | End: 2025-06-17 | Stop reason: HOSPADM

## 2025-06-17 RX ORDER — CEFAZOLIN SODIUM 2 G/50ML
2000 SOLUTION INTRAVENOUS ONCE
Status: COMPLETED | OUTPATIENT
Start: 2025-06-17 | End: 2025-06-17

## 2025-06-17 RX ORDER — ONDANSETRON 2 MG/ML
4 INJECTION INTRAMUSCULAR; INTRAVENOUS ONCE AS NEEDED
Status: COMPLETED | OUTPATIENT
Start: 2025-06-17 | End: 2025-06-17

## 2025-06-17 RX ORDER — ROCURONIUM BROMIDE 10 MG/ML
INJECTION, SOLUTION INTRAVENOUS AS NEEDED
Status: DISCONTINUED | OUTPATIENT
Start: 2025-06-17 | End: 2025-06-17

## 2025-06-17 RX ORDER — MAGNESIUM HYDROXIDE 1200 MG/15ML
LIQUID ORAL AS NEEDED
Status: DISCONTINUED | OUTPATIENT
Start: 2025-06-17 | End: 2025-06-17 | Stop reason: HOSPADM

## 2025-06-17 RX ORDER — MIDAZOLAM HYDROCHLORIDE 2 MG/2ML
INJECTION, SOLUTION INTRAMUSCULAR; INTRAVENOUS AS NEEDED
Status: DISCONTINUED | OUTPATIENT
Start: 2025-06-17 | End: 2025-06-17

## 2025-06-17 RX ORDER — PROPOFOL 10 MG/ML
INJECTION, EMULSION INTRAVENOUS AS NEEDED
Status: DISCONTINUED | OUTPATIENT
Start: 2025-06-17 | End: 2025-06-17

## 2025-06-17 RX ORDER — GLYCOPYRROLATE 0.2 MG/ML
INJECTION INTRAMUSCULAR; INTRAVENOUS AS NEEDED
Status: DISCONTINUED | OUTPATIENT
Start: 2025-06-17 | End: 2025-06-17

## 2025-06-17 RX ORDER — FENTANYL CITRATE 50 UG/ML
INJECTION, SOLUTION INTRAMUSCULAR; INTRAVENOUS AS NEEDED
Status: DISCONTINUED | OUTPATIENT
Start: 2025-06-17 | End: 2025-06-17

## 2025-06-17 RX ORDER — ACETAMINOPHEN 10 MG/ML
1000 INJECTION, SOLUTION INTRAVENOUS ONCE
Status: COMPLETED | OUTPATIENT
Start: 2025-06-17 | End: 2025-06-17

## 2025-06-17 RX ORDER — LIDOCAINE HYDROCHLORIDE 10 MG/ML
INJECTION, SOLUTION EPIDURAL; INFILTRATION; INTRACAUDAL; PERINEURAL AS NEEDED
Status: DISCONTINUED | OUTPATIENT
Start: 2025-06-17 | End: 2025-06-17

## 2025-06-17 RX ORDER — FENTANYL CITRATE/PF 50 MCG/ML
25 SYRINGE (ML) INJECTION
Status: DISCONTINUED | OUTPATIENT
Start: 2025-06-17 | End: 2025-06-17 | Stop reason: HOSPADM

## 2025-06-17 RX ORDER — SODIUM CHLORIDE, SODIUM LACTATE, POTASSIUM CHLORIDE, CALCIUM CHLORIDE 600; 310; 30; 20 MG/100ML; MG/100ML; MG/100ML; MG/100ML
20 INJECTION, SOLUTION INTRAVENOUS CONTINUOUS
Status: DISCONTINUED | OUTPATIENT
Start: 2025-06-17 | End: 2025-06-17 | Stop reason: HOSPADM

## 2025-06-17 RX ORDER — NEOSTIGMINE METHYLSULFATE 1 MG/ML
INJECTION INTRAVENOUS AS NEEDED
Status: DISCONTINUED | OUTPATIENT
Start: 2025-06-17 | End: 2025-06-17

## 2025-06-17 RX ORDER — ONDANSETRON 2 MG/ML
INJECTION INTRAMUSCULAR; INTRAVENOUS AS NEEDED
Status: DISCONTINUED | OUTPATIENT
Start: 2025-06-17 | End: 2025-06-17

## 2025-06-17 RX ORDER — OXYCODONE AND ACETAMINOPHEN 5; 325 MG/1; MG/1
1 TABLET ORAL EVERY 6 HOURS PRN
Qty: 12 TABLET | Refills: 0 | Status: SHIPPED | OUTPATIENT
Start: 2025-06-17 | End: 2025-06-27

## 2025-06-17 RX ORDER — HYDROMORPHONE HCL IN WATER/PF 6 MG/30 ML
0.2 PATIENT CONTROLLED ANALGESIA SYRINGE INTRAVENOUS
Status: DISCONTINUED | OUTPATIENT
Start: 2025-06-17 | End: 2025-06-17 | Stop reason: HOSPADM

## 2025-06-17 RX ORDER — SODIUM CHLORIDE, SODIUM LACTATE, POTASSIUM CHLORIDE, CALCIUM CHLORIDE 600; 310; 30; 20 MG/100ML; MG/100ML; MG/100ML; MG/100ML
50 INJECTION, SOLUTION INTRAVENOUS CONTINUOUS
Status: DISCONTINUED | OUTPATIENT
Start: 2025-06-17 | End: 2025-06-17 | Stop reason: HOSPADM

## 2025-06-17 RX ADMIN — FENTANYL CITRATE 50 MCG: 50 INJECTION INTRAMUSCULAR; INTRAVENOUS at 08:24

## 2025-06-17 RX ADMIN — ROCURONIUM BROMIDE 10 MG: 10 INJECTION, SOLUTION INTRAVENOUS at 09:16

## 2025-06-17 RX ADMIN — SODIUM CHLORIDE, SODIUM LACTATE, POTASSIUM CHLORIDE, AND CALCIUM CHLORIDE 50 ML/HR: .6; .31; .03; .02 INJECTION, SOLUTION INTRAVENOUS at 10:49

## 2025-06-17 RX ADMIN — SODIUM CHLORIDE, SODIUM LACTATE, POTASSIUM CHLORIDE, AND CALCIUM CHLORIDE: .6; .31; .03; .02 INJECTION, SOLUTION INTRAVENOUS at 07:53

## 2025-06-17 RX ADMIN — ROCURONIUM BROMIDE 50 MG: 10 INJECTION, SOLUTION INTRAVENOUS at 07:56

## 2025-06-17 RX ADMIN — NEOSTIGMINE METHYLSULFATE 5 MG: 1 INJECTION INTRAVENOUS at 10:00

## 2025-06-17 RX ADMIN — ROCURONIUM BROMIDE 10 MG: 10 INJECTION, SOLUTION INTRAVENOUS at 08:22

## 2025-06-17 RX ADMIN — ONDANSETRON 4 MG: 2 INJECTION INTRAMUSCULAR; INTRAVENOUS at 07:56

## 2025-06-17 RX ADMIN — MIDAZOLAM 2 MG: 1 INJECTION INTRAMUSCULAR; INTRAVENOUS at 07:53

## 2025-06-17 RX ADMIN — LIDOCAINE HYDROCHLORIDE 50 MG: 10 INJECTION, SOLUTION EPIDURAL; INFILTRATION; INTRACAUDAL; PERINEURAL at 07:56

## 2025-06-17 RX ADMIN — CEFAZOLIN SODIUM 2000 MG: 2 SOLUTION INTRAVENOUS at 07:53

## 2025-06-17 RX ADMIN — ONDANSETRON 4 MG: 2 INJECTION INTRAMUSCULAR; INTRAVENOUS at 10:41

## 2025-06-17 RX ADMIN — ACETAMINOPHEN 1000 MG: 10 INJECTION, SOLUTION INTRAVENOUS at 10:49

## 2025-06-17 RX ADMIN — ROCURONIUM BROMIDE 10 MG: 10 INJECTION, SOLUTION INTRAVENOUS at 08:42

## 2025-06-17 RX ADMIN — PROPOFOL 200 MG: 10 INJECTION, EMULSION INTRAVENOUS at 07:56

## 2025-06-17 RX ADMIN — GLYCOPYRROLATE 0.8 MG: 0.2 INJECTION, SOLUTION INTRAMUSCULAR; INTRAVENOUS at 10:00

## 2025-06-17 RX ADMIN — FENTANYL CITRATE 50 MCG: 50 INJECTION INTRAMUSCULAR; INTRAVENOUS at 07:56

## 2025-06-17 NOTE — DISCHARGE INSTR - AVS FIRST PAGE
Postoperative Care Instructions  Robotic Hernia Repair    1. General: You may feel pulling sensations around the wound or funny aches and pains around the incisions. This is normal. Even minor surgery is a change in your body and this is your body's reaction to it. If you have had abdominal surgery, it may help to support the incision with a small pillow or blanket for comfort when moving or coughing.    2. Wound care:  The glue over the incisions will fall off over the next week or two. If you have staples or stitches, they will be removed by the physician at your follow up appointment.    3. Showering: You may shower again 48 hours after surgery. Please do not soak wound in standing water such as a bath, hot tub, pool, lake, etc. Do not scrub or use exfoliants on the surgical wounds.    4. Activity: You may go up and down stairs, walk as much as you are comfortable, but walk at least 3 times each day. If you have had abdominal surgery, do not perform any strenuous exercise or lift anything heavier than 15-20 pounds for at least 4 weeks, unless cleared by your physician.    5. Diet: You may resume your regular diet. Please drink lots of water.    6. Medications: Resume all of your previous medications, unless told otherwise by the doctor.  A good option for pain control is to start with acetaminophen(Tylenol) 650mg and ibuprofen(Advil) 400-600mg and alternate taking them every 3 hours.  If this is not sufficient then you make take the narcotic pain medicine as prescribed. You do not need to take the narcotic pain medication unless you are having significant pain and discomfort. Please take the narcotic medication with food. Insure that you do not take more than 4000 mg of Tylenol per day.     7. Driving: You will need someone to drive you home on the day of surgery. Do not drive or make any important decisions while on narcotic pain medication. Generally, you may drive 48 hours after you've stopped taking all  narcotic pain medications.    8. Upset Stomach: You may take Maalox, Tums, or similar items for an upset stomach. If your narcotic pain medication causes an upset stomach, do not take it on an empty stomach. Try taking it with at least some crackers or toast.     9. Constipation: Patients often experience constipation after surgery, especially if taking narcotic pain medications. We recommend starting an over-the-counter medication for this, such as Metamucil, Senokot, Colace, milk of magnesia, etc. You may stop taking these medications a couple days after your last dose of narcotic medication. If you experience significant nausea or vomiting after abdominal surgery, call the office before trying any of these medications.     10. Call the office: If you are experiencing any of the following: fevers above 101.5°, significant nausea or vomiting, if the wound develops drainage and/or excessive redness around the wound, or if you have significant diarrhea or other worsening symptoms.    11. Pain: A prescription for narcotic pain medication will be sent to your pharmacy upon discharge from the hospital. Please only take this medication if absolutely necessary. Please return extra narcotics to your local pharmacy.

## 2025-06-17 NOTE — OP NOTE
OPERATIVE REPORT  PATIENT NAME: Rita Cardona    :  1970  MRN: [id number]  Pt Location: [facility] OR ROOM 02    SURGERY DATE: 2025    Surgeons and Role:     * Robert Bloch, MD - Primary     * Neto Mccarthy PA-C    Preop Diagnosis:  Umbilical hernia with obstruction [K42.0]    Post-Op Diagnosis Codes:     * Umbilical hernia with obstruction [K42.0]    Procedure(s):  REPAIR HERNIA UMBILICAL LAPAROSCOPIC W/ ROBOTICS AND WITH MESH    Specimen(s):  ID Type Source Tests Collected by Time Destination   1 :  Tissue Hernia Sac, Umbilical TISSUE EXAM Robert Bloch, MD 2025  9:00 AM        Estimated Blood Loss:   Minimal    Drains:  * No LDAs found *    Anesthesia Type:   General    Operative Indications:  Umbilical hernia with obstruction [K42.0]  Same same    Operative Findings:  1  Prior to opening the fascia, or reducing the contents of the hernia, the hernia defect was measured. The defect measured 2 cm by 2 members cm. This was repaired as described in the body of the report below.       Complications:   None    Procedure and Technique: Patient was identified by me and placed in the supine position on the operating room table.  General endotracheal anesthesia was started.  The abdomen is prepped and draped in a normal surgical manner and a timeout is performed.  The small incision is now made after giving lidocaine with epi at Meyer's point on the left.  This was done with a knife and followed by Reese and I cleared down to the fascia.  Veress needle is placed through the fascia into the abdominal cavity and I got a good drop test.  About 3.7 L of CO2 was put in then I stopped the CO2 flow and pulled out the Veress needle.  I entered the abdominal cavity with a 5 laparoscopic port at Meyer's point.  There did not appear to be any trauma present.  Under direct vision I placed 2 left-sided robotic ports and then I upstaged the left upper quadrant port for a robotic port.  I put a accessory port in  on the right side.  The hernia was incarcerated and I pulled this out with 2 robotic instruments Bovie as necessary.  Eventually I got the omentum and the fat out of the incarcerated hernia cavity.  At this point in time I closed with running permanent V-Loc suture.  While doing this I put the pressure down to 10.  After this was done I took a 8 cm mesh and cut the Prolene tails off.  I then put a suture through at the base of the tails with a PDS suture and put it in the abdomen.  A Veress needle was placed near the umbilicus after first checking position with a small needle.  Roosevelt Thao was placed and it pulled the mesh up to the abdominal wall.  Mesh was now sewn in using 3-0 absorbable V-Loc sutures.  This looked excellent.  There was no bleeding noted.  I then closed the accessory port with 0 PDS using the white cone device to plug the hole and put a suture down.  Robot was undocked at this point and all trocars were removed.  Skin was closed with PDS and Exofin   I was present for all critical portions of the procedure., A qualified resident physician was not available., and A physician assistant was required during the procedure for retraction, tissue handling, dissection and suturing.    Patient Disposition:  PACU          SIGNATURE: Robert Bloch, MD  DATE: June 17, 2025  TIME: 10:22 AM

## 2025-06-17 NOTE — ANESTHESIA POSTPROCEDURE EVALUATION
Post-Op Assessment Note    CV Status:  Stable  Pain Score: 0    Pain management: adequate       Mental Status:  Sleepy and arousable   Hydration Status:  Euvolemic and stable   PONV Controlled:  Controlled   Airway Patency:  Patent     Post Op Vitals Reviewed: Yes    No anethesia notable event occurred.    Staff: CRNA           Last Filed PACU Vitals:  Vitals Value Taken Time   Temp     Pulse 68    /58    Resp 17    SpO2 95%

## 2025-06-17 NOTE — ANESTHESIA PREPROCEDURE EVALUATION
Procedure:  REPAIR HERNIA UMBILICAL LAPAROSCOPIC W/ ROBOTICS (Abdomen)    Relevant Problems   CARDIO   (+) Primary hypertension      NEURO/PSYCH   (+) Chronic left shoulder pain      PULMONARY   (+) Mild persistent asthma without complication        Physical Exam    Airway     Mallampati score: II  TM Distance: >3 FB  Neck ROM: full  Mouth opening: >= 4 cm      Cardiovascular  Cardiovascular exam normal    Dental   No notable dental hx     Pulmonary  Pulmonary exam normal     Neurological  - normal exam  She appears oriented x3.      Other Findings  post-pubertal.      Anesthesia Plan  ASA Score- 3     Anesthesia Type- general with ASA Monitors.         Additional Monitors:     Airway Plan: Oral ETT.           Plan Factors-Exercise tolerance (METS): >4 METS.    Chart reviewed. EKG reviewed.  Existing labs reviewed. Patient summary reviewed.    Patient is not a current smoker. Patient not instructed to abstain from smoking on day of procedure. Patient did not smoke on day of surgery.            Induction-     Postoperative Plan- Plan for postoperative opioid use.   Monitoring Plan - Monitoring plan - standard ASA monitoring  Post Operative Pain Plan - plan for postoperative opioid use    Perioperative Resuscitation Plan - Level 1 - Full Code.       Informed Consent- Anesthetic plan and risks discussed with patient and spouse.  I personally reviewed this patient with the CRNA. Discussed and agreed on the Anesthesia Plan with the CRNA..      NPO Status:  No vitals data found for the desired time range.        Lab Results   Component Value Date    HGBA1C 6.9 (H) 04/17/2025       Lab Results   Component Value Date    K 3.7 04/17/2025    CL 99 04/17/2025    CO2 31 04/17/2025    BUN 10 04/17/2025    CREATININE 0.94 04/17/2025    GLUF 118 (H) 04/17/2025    CALCIUM 9.5 04/17/2025    AST 27 04/17/2025    ALT 38 04/17/2025    ALKPHOS 72 04/17/2025    EGFR 69 04/17/2025       Lab Results   Component Value Date    WBC 8.32  04/17/2025    HGB 15.4 04/17/2025    HCT 48.0 (H) 04/17/2025    MCV 89 04/17/2025     04/17/2025     Sinus rhythm with short TN  Nonspecific ST abnormality  Abnormal ECG  No previous ECGs available  Confirmed by Tino Howell ([id number]) on 6/2/2025 2:34:05 PM     Specimen Collected: 06/02/25 13:30

## 2025-06-17 NOTE — ANESTHESIA POSTPROCEDURE EVALUATION
Post-Op Assessment Note    CV Status:  Stable    Pain management: adequate       Mental Status:  Alert and awake   Hydration Status:  Euvolemic   PONV Controlled:  Controlled   Airway Patency:  Patent     Post Op Vitals Reviewed: Yes    No anethesia notable event occurred.    Staff: Anesthesiologist           Last Filed PACU Vitals:  Vitals Value Taken Time   Temp 97 °F (36.1 °C) 06/17/25 11:18   Pulse 47 06/17/25 11:23   /59 06/17/25 11:20   Resp 8 06/17/25 11:23   SpO2 94 % 06/17/25 11:23   Vitals shown include unfiled device data.    Modified Lisa:     Vitals Value Taken Time   Activity 2 06/17/25 11:18   Respiration 2 06/17/25 11:18   Circulation 2 06/17/25 11:18   Consciousness 2 06/17/25 11:18   Oxygen Saturation 2 06/17/25 11:18     Modified Lisa Score: 10

## 2025-06-20 PROCEDURE — 88302 TISSUE EXAM BY PATHOLOGIST: CPT | Performed by: STUDENT IN AN ORGANIZED HEALTH CARE EDUCATION/TRAINING PROGRAM

## 2025-06-26 ENCOUNTER — NURSE TRIAGE (OUTPATIENT)
Age: 55
End: 2025-06-26

## 2025-06-26 NOTE — TELEPHONE ENCOUNTER
"REASON FOR CONVERSATION: Post-op Problem    SYMPTOMS: Red itchy rash/hives at 2 incison sites on abdomen    OTHER HEALTH INFORMATION: post op umbilical hernia repair 6/17 with Dr Bloch. Patient noticed red, itchy rash at 2 sites yesterday. Unsure if it is a reaction to the glue.    PROTOCOL DISPOSITION: Discuss With PCP and Callback by Nurse Today    CARE ADVICE PROVIDED: advised to submit images for review. Advised could try Benadryl. States she already takes Xyzal and tried Benadryl cream around the areas.    PRACTICE FOLLOW-UP: Please review images and advise  #[phone]    Reason for Disposition   Caller has NON-URGENT question and triager unable to answer question    Answer Assessment - Initial Assessment Questions  1. SYMPTOM: \"What's the main symptom you're concerned about?\" (e.g., drainage, incision opened up, pain, redness)      Itchy red incisions, hives  2. ONSET: \"When did incision redness  start?\"      yesterday  3. SURGERY: \"What surgery did you have?\"      Umbilical hernia repair  4. DATE of SURGERY: \"When was the surgery?\"       6/17  5. INCISION SITE: \"Where is the incision located?\"       abdomen  6. REDNESS: \"Is there any redness at the incision site?\" If Yes, ask: \"How wide across is the redness?\" (Inches, centimeters)       yes  7. PAIN: \"Is there any pain?\" If Yes, ask: \"How bad is it?\"  (Scale 1-10; or mild, moderate, severe)      denies  8. BLEEDING: \"Is there any bleeding?\" If Yes, ask: \"How much?\" and \"Where?\"      denies  9. DRAINAGE: \"Is there any drainage from the incision site?\" If Yes, ask: \"What color and how much?\" (e.g., red, cloudy, pus; drops, teaspoon)      denies  10. FEVER: \"Do you have a fever?\" If Yes, ask: \"What is your temperature, how was it measured, and when did it start?\"        denies  11. OTHER SYMPTOMS: \"Do you have any other symptoms?\" (e.g., dizziness, rash elsewhere on body, shaking chills, weakness)        Rash/hives itching at 2 incision sites    Protocols " used: Post-Op Incision Symptoms and Questions-Adult-[place]

## 2025-06-30 ENCOUNTER — OFFICE VISIT (OUTPATIENT)
Dept: FAMILY MEDICINE CLINIC | Facility: CLINIC | Age: 55
End: 2025-06-30
Payer: COMMERCIAL

## 2025-06-30 VITALS
WEIGHT: 238.2 LBS | HEIGHT: 64 IN | HEART RATE: 97 BPM | DIASTOLIC BLOOD PRESSURE: 72 MMHG | SYSTOLIC BLOOD PRESSURE: 120 MMHG | OXYGEN SATURATION: 97 % | TEMPERATURE: 97.4 F | BODY MASS INDEX: 40.67 KG/M2

## 2025-06-30 DIAGNOSIS — K42.9 UMBILICAL HERNIA WITHOUT OBSTRUCTION AND WITHOUT GANGRENE: ICD-10-CM

## 2025-06-30 DIAGNOSIS — R73.9 HYPERGLYCEMIA: ICD-10-CM

## 2025-06-30 DIAGNOSIS — I10 PRIMARY HYPERTENSION: ICD-10-CM

## 2025-06-30 DIAGNOSIS — B35.4 TINEA CORPORIS: ICD-10-CM

## 2025-06-30 DIAGNOSIS — L30.9 DERMATITIS: Primary | ICD-10-CM

## 2025-06-30 PROCEDURE — 99214 OFFICE O/P EST MOD 30 MIN: CPT | Performed by: FAMILY MEDICINE

## 2025-06-30 RX ORDER — CLOTRIMAZOLE AND BETAMETHASONE DIPROPIONATE 10; .64 MG/G; MG/G
CREAM TOPICAL 2 TIMES DAILY
Qty: 15 G | Refills: 1 | Status: SHIPPED | OUTPATIENT
Start: 2025-06-30

## 2025-06-30 NOTE — PROGRESS NOTES
Name: Rita Cardona      : 1970      MRN: [id number]  Encounter Provider: Coby Soto MD  Encounter Date: 2025   Encounter department: [facility]  :  Assessment & Plan  Dermatitis    Orders:    clotrimazole-betamethasone (LOTRISONE) 1-0.05 % cream; Apply topically 2 (two) times a day    Tinea corporis    Orders:    clotrimazole-betamethasone (LOTRISONE) 1-0.05 % cream; Apply topically 2 (two) times a day    Primary hypertension  Controlled, weight loss helping, maybe drop the amlodipine to 5mg from 10mg with current weight loss       Hyperglycemia  Looking to see drop from 6.9 in April, making changes in diet, lower carb, exercise was limited after surgery  Orders:    Comprehensive metabolic panel; Future    Hemoglobin A1C; Future    Umbilical hernia without obstruction and without gangrene  S/p repair, colace bid, miralax daily              History of Present Illness   Here for weight loss. Has been on phentermine. Has lost 14#. She was seen for constipation and belly pain, found to have a hernia. She had robotic assisted removal, she notes a rash at the incision points 3 out of 5, the lowest one is the worst, very itchy. Notes some constipation, thinks the hernia has come back. Has been using colace bid, benefiber gummies, using miralax prn. Drinking adeq water throughout the day. Has surgery f/u on . BP controlled. Has not been taking the phentermine regularly. BP dropped after surgery to 90/54.       Review of Systems   Constitutional:  Negative for chills and fever.   HENT:  Negative for ear pain and sore throat.    Eyes:  Negative for pain and visual disturbance.   Respiratory:  Negative for cough and shortness of breath.    Cardiovascular:  Negative for chest pain and palpitations.   Gastrointestinal:  Positive for abdominal distention, abdominal pain and constipation. Negative for vomiting.   Genitourinary:  Negative for dysuria and hematuria.  "  Musculoskeletal:  Negative for arthralgias and back pain.   Skin:  Negative for color change and rash.   Neurological:  Negative for seizures and syncope.   All other systems reviewed and are negative.      Objective   /72 (BP Location: Left arm, Patient Position: Sitting, Cuff Size: Large)   Pulse 97   Temp (!) 97.4 °F (36.3 °C) (Temporal)   Ht 5' 4\" (1.626 m)   Wt 108 kg (238 lb 3.2 oz)   SpO2 97%   BMI 40.89 kg/m²      Physical Exam  Vitals reviewed.   Constitutional:       Appearance: Normal appearance. She is well-developed.     Cardiovascular:      Rate and Rhythm: Normal rate and regular rhythm.      Heart sounds: Normal heart sounds.   Pulmonary:      Effort: Pulmonary effort is normal.      Breath sounds: Normal breath sounds.   Abdominal:      Palpations: Abdomen is soft.      Hernia: A hernia (umbilical, nonreducible, nontender with palpation or attmepts to reduce, firm ball, well circumscribed) is present.     Skin:     General: Skin is warm.      Findings: Rash (redness around the lowest incision, with some satellitel lesions) present.     Neurological:      General: No focal deficit present.      Mental Status: She is alert and oriented to person, place, and time.     Psychiatric:         Mood and Affect: Mood normal.         Behavior: Behavior normal.         Thought Content: Thought content normal.         Judgment: Judgment normal.         "

## 2025-06-30 NOTE — ASSESSMENT & PLAN NOTE
Controlled, weight loss helping, maybe drop the amlodipine to 5mg from 10mg with current weight loss

## 2025-07-02 ENCOUNTER — TELEPHONE (OUTPATIENT)
Dept: FAMILY MEDICINE CLINIC | Facility: CLINIC | Age: 55
End: 2025-07-02

## 2025-07-03 NOTE — TELEPHONE ENCOUNTER
PA for     phentermine 30 MG capsule   APPROVED     Date(s) approved 7/3/2025 - 10/3/2025    Case #     Patient advised by          []FiPathhart Message  []Phone call   [x]LMOM  []L/M to call office as no active Communication consent on file  []Unable to leave detailed message as VM not approved on Communication consent       Pharmacy advised by    [x]Fax  []Phone call  []Secure Chat    Specialty Pharmacy    []      Approval letter scanned into Media No no letter available at time of approval.

## 2025-07-03 NOTE — TELEPHONE ENCOUNTER
PA for phentermine 30 MG capsule  SUBMITTED to [facility] [employer] [place]     via    []CMM-KEY:    [x]Surescripts-Case ID #    []Availity-Auth ID #  NDC #    []Faxed to plan   []Other website    []Phone call Case ID #      [x]PA sent as URGENT    All office notes, labs and other pertaining documents and studies sent. Clinical questions answered. Awaiting determination from insurance company.     Turnaround time for your insurance to make a decision on your Prior Authorization can take 7-21 business days.

## 2025-07-07 ENCOUNTER — OFFICE VISIT (OUTPATIENT)
Dept: SURGERY | Facility: CLINIC | Age: 55
End: 2025-07-07
Payer: COMMERCIAL

## 2025-07-07 VITALS
OXYGEN SATURATION: 94 % | BODY MASS INDEX: 40.49 KG/M2 | SYSTOLIC BLOOD PRESSURE: 120 MMHG | HEIGHT: 64 IN | WEIGHT: 237.2 LBS | TEMPERATURE: 96.4 F | DIASTOLIC BLOOD PRESSURE: 82 MMHG | HEART RATE: 66 BPM

## 2025-07-07 DIAGNOSIS — K42.0 UMBILICAL HERNIA WITH OBSTRUCTION: Primary | ICD-10-CM

## 2025-07-07 PROCEDURE — 99212 OFFICE O/P EST SF 10 MIN: CPT | Performed by: SURGERY

## 2025-07-07 NOTE — PROGRESS NOTES
Postop after a robotic umbilical/ventral hernia repair.  She has no problems whatsoever getting around and states that she feels well.  She had called the office because of some redness at the incision sites and this is quite better since the glue is off.  She says she has a little swelling at the hernia site and what she is feeling is a seroma.  I cannot feel any recurrence of hernia nor is there any evidence of infection or hematoma.  I told her to increase her activity and return if needed.  I told her the best thing about a seroma is that the body usually absorbs

## 2025-07-09 ENCOUNTER — TELEPHONE (OUTPATIENT)
Age: 55
End: 2025-07-09

## 2025-07-09 NOTE — TELEPHONE ENCOUNTER
"REASON FOR CONVERSATION: Advice Only    SYMPTOMS: forgot to ask during last office visit if she has any activity/lifting restrictions. Offers no complaints.    OTHER HEALTH INFORMATION: s/p Procedure: REPAIR HERNIA UMBILICAL LAPAROSCOPIC W/ ROBOTICS AND WITH MESH (Abdomen) with Dr. Bloch on 6/17/25.    PROTOCOL DISPOSITION: Information or Advice Only Call    CARE ADVICE PROVIDED: Per last office visit note on 7/7/25: \"I told her to increase her activity and return if needed.\" PT verbalized understanding and agreeable to plan. PT will call back with any new or worsening sxs.    PRACTICE FOLLOW-UP: prn        "

## 2025-07-18 ENCOUNTER — HOSPITAL ENCOUNTER (OUTPATIENT)
Dept: RADIOLOGY | Facility: MEDICAL CENTER | Age: 55
Discharge: HOME | End: 2025-07-18
Attending: PHYSICIAN ASSISTANT
Payer: COMMERCIAL

## 2025-07-18 VITALS — HEIGHT: 64 IN | WEIGHT: 237 LBS | BODY MASS INDEX: 40.46 KG/M2

## 2025-07-18 DIAGNOSIS — Z12.31 VISIT FOR SCREENING MAMMOGRAM: ICD-10-CM

## 2025-07-18 PROCEDURE — 77067 SCR MAMMO BI INCL CAD: CPT

## 2025-07-18 PROCEDURE — 77063 BREAST TOMOSYNTHESIS BI: CPT

## 2025-07-25 DIAGNOSIS — R03.0 ELEVATED BLOOD PRESSURE READING: ICD-10-CM

## 2025-07-28 ENCOUNTER — PATIENT MESSAGE (OUTPATIENT)
Dept: OBGYN CLINIC | Facility: MEDICAL CENTER | Age: 55
End: 2025-07-28

## 2025-07-28 RX ORDER — AMLODIPINE BESYLATE 10 MG/1
10 TABLET ORAL DAILY
Qty: 90 TABLET | Refills: 1 | Status: SHIPPED | OUTPATIENT
Start: 2025-07-28

## 2025-07-30 ENCOUNTER — OFFICE VISIT (OUTPATIENT)
Dept: OBGYN CLINIC | Facility: CLINIC | Age: 55
End: 2025-07-30
Payer: COMMERCIAL

## 2025-07-30 VITALS — SYSTOLIC BLOOD PRESSURE: 122 MMHG | DIASTOLIC BLOOD PRESSURE: 86 MMHG | BODY MASS INDEX: 40.96 KG/M2 | WEIGHT: 238.6 LBS

## 2025-07-30 DIAGNOSIS — N81.6 RECTOCELE: Primary | ICD-10-CM

## 2025-07-30 PROCEDURE — 99213 OFFICE O/P EST LOW 20 MIN: CPT | Performed by: OBSTETRICS & GYNECOLOGY

## (undated) DEVICE — GLOVE INDICATOR PI UNDERGLOVE SZ 7.5 BLUE

## (undated) DEVICE — VISUALIZATION SYSTEM: Brand: CLEARIFY

## (undated) DEVICE — BLADELESS OBTURATOR: Brand: WECK VISTA

## (undated) DEVICE — INSUFFLATION NEEDLE TO ESTABLISH PNEUMOPERITONEUM.: Brand: INSUFFLATION NEEDLE

## (undated) DEVICE — SUT PDS II 0 CT-1 36 IN Z346H

## (undated) DEVICE — ASTOUND STANDARD SURGICAL GOWN, XL: Brand: CONVERTORS

## (undated) DEVICE — SUT VICRYL 0 UR-6 27 IN J603H

## (undated) DEVICE — ABSORBABLE WOUND CLOSURE DEVICE: Brand: V-LOC 180

## (undated) DEVICE — CANNULA SEAL

## (undated) DEVICE — SUT MONOCRYL 4-0 PS-2 27 IN Y426H

## (undated) DEVICE — LAPAROTOMY DRAPE WITH POUCHES: Brand: CONVERTORS

## (undated) DEVICE — ARM DRAPE

## (undated) DEVICE — TROCAR: Brand: KII FIOS FIRST ENTRY

## (undated) DEVICE — SUT PDS II 0 CT-2 27 IN Z334H

## (undated) DEVICE — PMI DISPOSABLE PUNCTURE CLOSURE DEVICE / SUTURE GRASPER: Brand: PMI

## (undated) DEVICE — TIP COVER ACCESSORY

## (undated) DEVICE — GROUNDING PAD UNIVERSAL SLW

## (undated) DEVICE — SYRINGE 10ML LL

## (undated) DEVICE — [HIGH FLOW INSUFFLATOR,  DO NOT USE IF PACKAGE IS DAMAGED,  KEEP DRY,  KEEP AWAY FROM SUNLIGHT,  PROTECT FROM HEAT AND RADIOACTIVE SOURCES.]: Brand: PNEUMOSURE

## (undated) DEVICE — LARGE NEEDLE DRIVER: Brand: ENDOWRIST

## (undated) DEVICE — GLOVE SRG BIOGEL 6.5

## (undated) DEVICE — EXOFIN PRECISION PEN HIGH VISCOSITY TOPICAL SKIN ADHESIVE: Brand: EXOFIN PRECISION PEN, 1G

## (undated) DEVICE — GLOVE INDICATOR PI UNDERGLOVE SZ 6.5 BLUE

## (undated) DEVICE — ELECTRO LUBE IS A SINGLE PATIENT USE DEVICE THAT IS INTENDED TO BE USED ON ELECTROSURGICAL ELECTRODES TO REDUCE STICKING.: Brand: KEY SURGICAL ELECTRO LUBE

## (undated) DEVICE — CHLORAPREP HI-LITE 26ML ORANGE

## (undated) DEVICE — FORCE BIPOLAR: Brand: ENDOWRIST

## (undated) DEVICE — GLOVE SRG BIOGEL 7.5

## (undated) DEVICE — NEPTUNE E-SEP SMOKE EVACUATION PENCIL, COATED, 70MM BLADE, PUSH BUTTON SWITCH: Brand: NEPTUNE E-SEP

## (undated) DEVICE — MAYO STAND COVER: Brand: CONVERTORS

## (undated) DEVICE — COLUMN DRAPE

## (undated) DEVICE — ALLENTOWN LAP CHOLE APP PACK: Brand: CARDINAL HEALTH

## (undated) DEVICE — MONOPOLAR CURVED SCISSORS: Brand: ENDOWRIST;DAVINCI SI